# Patient Record
Sex: MALE | Race: WHITE
[De-identification: names, ages, dates, MRNs, and addresses within clinical notes are randomized per-mention and may not be internally consistent; named-entity substitution may affect disease eponyms.]

---

## 2006-01-30 VITALS — SYSTOLIC BLOOD PRESSURE: 5 MMHG

## 2017-04-27 ENCOUNTER — HOSPITAL ENCOUNTER (OUTPATIENT)
Dept: HOSPITAL 19 - COL.RAD | Age: 82
End: 2017-04-27
Payer: MEDICARE

## 2017-04-27 DIAGNOSIS — M54.16: ICD-10-CM

## 2017-04-27 DIAGNOSIS — M46.1: Primary | ICD-10-CM

## 2017-05-25 ENCOUNTER — HOSPITAL ENCOUNTER (OUTPATIENT)
Dept: HOSPITAL 6 - AMSURD | Age: 82
End: 2017-05-25
Attending: INTERNAL MEDICINE
Payer: MEDICARE

## 2017-05-25 ENCOUNTER — HOSPITAL ENCOUNTER (OUTPATIENT)
Dept: HOSPITAL 19 - ZLAB.WCH | Age: 82
End: 2017-05-25

## 2017-05-25 VITALS
SYSTOLIC BLOOD PRESSURE: 128 MMHG | SYSTOLIC BLOOD PRESSURE: 128 MMHG | SYSTOLIC BLOOD PRESSURE: 128 MMHG | DIASTOLIC BLOOD PRESSURE: 68 MMHG | SYSTOLIC BLOOD PRESSURE: 128 MMHG | SYSTOLIC BLOOD PRESSURE: 128 MMHG | DIASTOLIC BLOOD PRESSURE: 68 MMHG | DIASTOLIC BLOOD PRESSURE: 68 MMHG | SYSTOLIC BLOOD PRESSURE: 128 MMHG | SYSTOLIC BLOOD PRESSURE: 128 MMHG | DIASTOLIC BLOOD PRESSURE: 68 MMHG | DIASTOLIC BLOOD PRESSURE: 68 MMHG | SYSTOLIC BLOOD PRESSURE: 128 MMHG | DIASTOLIC BLOOD PRESSURE: 68 MMHG | DIASTOLIC BLOOD PRESSURE: 68 MMHG | SYSTOLIC BLOOD PRESSURE: 128 MMHG | DIASTOLIC BLOOD PRESSURE: 68 MMHG | DIASTOLIC BLOOD PRESSURE: 68 MMHG | DIASTOLIC BLOOD PRESSURE: 68 MMHG | SYSTOLIC BLOOD PRESSURE: 128 MMHG

## 2017-05-25 VITALS — WEIGHT: 197.31 LBS | HEIGHT: 69.02 IN | BODY MASS INDEX: 29.22 KG/M2

## 2017-05-25 DIAGNOSIS — E11.65: Primary | ICD-10-CM

## 2017-05-25 DIAGNOSIS — Z01.89: Primary | ICD-10-CM

## 2017-05-25 DIAGNOSIS — E53.8: ICD-10-CM

## 2017-05-25 DIAGNOSIS — N52.9: ICD-10-CM

## 2017-05-25 DIAGNOSIS — Z01.812: ICD-10-CM

## 2017-05-25 DIAGNOSIS — I10: ICD-10-CM

## 2017-05-25 DIAGNOSIS — E78.5: ICD-10-CM

## 2017-05-26 ENCOUNTER — HOSPITAL ENCOUNTER (OUTPATIENT)
Dept: HOSPITAL 6 - CARDREHAB | Age: 82
End: 2017-05-26
Attending: INTERNAL MEDICINE
Payer: MEDICARE

## 2017-05-26 DIAGNOSIS — R06.02: Primary | ICD-10-CM

## 2017-05-26 DIAGNOSIS — I10: ICD-10-CM

## 2017-05-26 PROCEDURE — A9500 TC99M SESTAMIBI: HCPCS

## 2017-05-30 ENCOUNTER — HOSPITAL ENCOUNTER (OUTPATIENT)
Dept: HOSPITAL 6 - RAD | Age: 82
End: 2017-05-30
Attending: INTERNAL MEDICINE
Payer: MEDICARE

## 2017-05-30 DIAGNOSIS — E78.5: ICD-10-CM

## 2017-05-30 DIAGNOSIS — R06.02: ICD-10-CM

## 2017-05-30 DIAGNOSIS — I10: ICD-10-CM

## 2017-05-30 DIAGNOSIS — G45.9: Primary | ICD-10-CM

## 2017-05-30 DIAGNOSIS — E78.00: ICD-10-CM

## 2017-05-30 DIAGNOSIS — E11.9: ICD-10-CM

## 2017-06-02 ENCOUNTER — HOSPITAL ENCOUNTER (OUTPATIENT)
Dept: HOSPITAL 6 - CARDREHAB | Age: 82
End: 2017-06-02
Attending: INTERNAL MEDICINE
Payer: MEDICARE

## 2017-06-02 DIAGNOSIS — E11.9: ICD-10-CM

## 2017-06-02 DIAGNOSIS — I10: ICD-10-CM

## 2017-06-02 DIAGNOSIS — E78.00: ICD-10-CM

## 2017-06-02 DIAGNOSIS — E78.5: ICD-10-CM

## 2017-06-02 DIAGNOSIS — R06.02: Primary | ICD-10-CM

## 2017-06-02 PROCEDURE — A9500 TC99M SESTAMIBI: HCPCS

## 2017-06-19 ENCOUNTER — HOSPITAL ENCOUNTER (OUTPATIENT)
Dept: HOSPITAL 19 - COL.CAR | Age: 82
Discharge: HOME | End: 2017-06-19
Attending: INTERNAL MEDICINE
Payer: MEDICARE

## 2017-06-19 VITALS — HEART RATE: 65 BPM | SYSTOLIC BLOOD PRESSURE: 122 MMHG | DIASTOLIC BLOOD PRESSURE: 65 MMHG

## 2017-06-19 VITALS — BODY MASS INDEX: 29.83 KG/M2 | HEIGHT: 70 IN | WEIGHT: 208.34 LBS

## 2017-06-19 VITALS — SYSTOLIC BLOOD PRESSURE: 11 MMHG | HEART RATE: 74 BPM | DIASTOLIC BLOOD PRESSURE: 67 MMHG

## 2017-06-19 VITALS — SYSTOLIC BLOOD PRESSURE: 122 MMHG | DIASTOLIC BLOOD PRESSURE: 67 MMHG | HEART RATE: 75 BPM

## 2017-06-19 VITALS — HEART RATE: 77 BPM | SYSTOLIC BLOOD PRESSURE: 109 MMHG | DIASTOLIC BLOOD PRESSURE: 62 MMHG

## 2017-06-19 VITALS — DIASTOLIC BLOOD PRESSURE: 60 MMHG | HEART RATE: 76 BPM | TEMPERATURE: 97.6 F | SYSTOLIC BLOOD PRESSURE: 127 MMHG

## 2017-06-19 VITALS — DIASTOLIC BLOOD PRESSURE: 67 MMHG | HEART RATE: 75 BPM | SYSTOLIC BLOOD PRESSURE: 124 MMHG

## 2017-06-19 VITALS — SYSTOLIC BLOOD PRESSURE: 135 MMHG | DIASTOLIC BLOOD PRESSURE: 78 MMHG | HEART RATE: 76 BPM

## 2017-06-19 VITALS — HEART RATE: 76 BPM | DIASTOLIC BLOOD PRESSURE: 68 MMHG | SYSTOLIC BLOOD PRESSURE: 122 MMHG

## 2017-06-19 VITALS — DIASTOLIC BLOOD PRESSURE: 67 MMHG | SYSTOLIC BLOOD PRESSURE: 128 MMHG | HEART RATE: 73 BPM

## 2017-06-19 DIAGNOSIS — E53.8: ICD-10-CM

## 2017-06-19 DIAGNOSIS — Z85.46: ICD-10-CM

## 2017-06-19 DIAGNOSIS — G25.81: ICD-10-CM

## 2017-06-19 DIAGNOSIS — M19.90: ICD-10-CM

## 2017-06-19 DIAGNOSIS — Z80.9: ICD-10-CM

## 2017-06-19 DIAGNOSIS — I07.1: ICD-10-CM

## 2017-06-19 DIAGNOSIS — Z96.653: ICD-10-CM

## 2017-06-19 DIAGNOSIS — I35.0: ICD-10-CM

## 2017-06-19 DIAGNOSIS — I25.10: Primary | ICD-10-CM

## 2017-06-19 DIAGNOSIS — I10: ICD-10-CM

## 2017-06-19 DIAGNOSIS — G51.0: ICD-10-CM

## 2017-06-19 DIAGNOSIS — F32.9: ICD-10-CM

## 2017-06-19 DIAGNOSIS — E11.42: ICD-10-CM

## 2017-06-19 DIAGNOSIS — E78.2: ICD-10-CM

## 2017-06-19 LAB
ANION GAP SERPL CALC-SCNC: 14 MMOL/L (ref 7–16)
BUN SERPL-MCNC: 18 MG/DL (ref 9–20)
CALCIUM SERPL-MCNC: 9.4 MG/DL (ref 8.4–10.2)
CHLORIDE SERPL-SCNC: 103 MMOL/L (ref 98–107)
CO2 SERPL-SCNC: 22 MMOL/L (ref 22–30)
CREAT SERPL-SCNC: 0.79 MG/DL (ref 0.66–1.25)
ERYTHROCYTE [DISTWIDTH] IN BLOOD BY AUTOMATED COUNT: 12.7 % (ref 11.5–14.5)
GLUCOSE SERPL-MCNC: 168 MG/DL (ref 74–106)
HCT VFR BLD AUTO: 40 % (ref 42–52)
HGB BLD-MCNC: 13.4 G/DL (ref 13.5–18)
INR BLD: 1 (ref 0.8–3)
MCH RBC QN AUTO: 32 PG (ref 27–31)
MCHC RBC AUTO-ENTMCNC: 34 G/DL (ref 33–37)
MCV RBC AUTO: 97 FL (ref 80–100)
PLATELET # BLD AUTO: 166 K/MM3 (ref 130–400)
PMV BLD AUTO: 10 FL (ref 7.4–10.4)
POTASSIUM SERPL-SCNC: 4.6 MMOL/L (ref 3.4–5)
PROTHROMBIN TIME: 11 SECONDS (ref 9.7–12.8)
RBC # BLD AUTO: 4.13 M/MM3 (ref 4.2–5.6)
SODIUM SERPL-SCNC: 139 MMOL/L (ref 137–145)
WBC # BLD AUTO: 6.2 K/MM3 (ref 4.8–10.8)

## 2017-06-19 PROCEDURE — C1769 GUIDE WIRE: HCPCS

## 2017-07-24 ENCOUNTER — HOSPITAL ENCOUNTER (OUTPATIENT)
Dept: HOSPITAL 6 - LAB | Age: 82
End: 2017-07-24
Attending: INTERNAL MEDICINE
Payer: MEDICARE

## 2017-07-24 DIAGNOSIS — M54.16: ICD-10-CM

## 2017-07-24 DIAGNOSIS — Z01.818: Primary | ICD-10-CM

## 2017-07-24 DIAGNOSIS — N30.00: ICD-10-CM

## 2017-08-12 ENCOUNTER — HOSPITAL ENCOUNTER (EMERGENCY)
Dept: HOSPITAL 19 - COL.ER | Age: 82
Discharge: HOME | End: 2017-08-12
Payer: MEDICARE

## 2017-08-12 VITALS — DIASTOLIC BLOOD PRESSURE: 74 MMHG | HEART RATE: 92 BPM | SYSTOLIC BLOOD PRESSURE: 140 MMHG

## 2017-08-12 VITALS — WEIGHT: 200.4 LBS | HEIGHT: 70 IN | BODY MASS INDEX: 28.69 KG/M2

## 2017-08-12 VITALS — TEMPERATURE: 97.7 F

## 2017-08-12 DIAGNOSIS — M54.5: ICD-10-CM

## 2017-08-12 DIAGNOSIS — Z79.84: ICD-10-CM

## 2017-08-12 DIAGNOSIS — Z95.5: ICD-10-CM

## 2017-08-12 DIAGNOSIS — I25.10: ICD-10-CM

## 2017-08-12 DIAGNOSIS — Z98.1: ICD-10-CM

## 2017-08-12 DIAGNOSIS — E11.9: ICD-10-CM

## 2017-08-12 DIAGNOSIS — I10: ICD-10-CM

## 2017-08-12 DIAGNOSIS — G89.18: Primary | ICD-10-CM

## 2017-08-12 LAB
ADD PATHOLOGY DIFF REVIEW: NO
ADJUSTED CALCIUM: 9.7 MG/DL (ref 8.4–10.2)
ALBUMIN SERPL-MCNC: 3.6 GM/DL (ref 3.5–5)
ALP SERPL-CCNC: 118 U/L (ref 50–136)
ALT SERPL-CCNC: 28 U/L (ref 21–72)
ANION GAP SERPL CALC-SCNC: 11 MMOL/L (ref 7–16)
ANISOCYTOSIS BLD QL: (no result)
BILIRUB SERPL-MCNC: 0.7 MG/DL (ref 0–1)
BUN SERPL-MCNC: 11 MG/DL (ref 9–20)
CALCIUM SERPL-MCNC: 9.4 MG/DL (ref 8.4–10.2)
CHLORIDE SERPL-SCNC: 98 MMOL/L (ref 98–107)
CO2 SERPL-SCNC: 26 MMOL/L (ref 22–30)
CREAT SERPL-SCNC: 0.8 MG/DL (ref 0.66–1.25)
ERYTHROCYTE [DISTWIDTH] IN BLOOD BY AUTOMATED COUNT: 13.5 % (ref 11.5–14.5)
GLUCOSE SERPL-MCNC: 164 MG/DL (ref 74–106)
HCT VFR BLD AUTO: 30.2 % (ref 42–52)
HGB BLD-MCNC: 9.8 G/DL (ref 13.5–18)
HYPOCHROMIA BLD QL SMEAR: (no result)
INR BLD: 1.1 (ref 0.8–3)
MCH RBC QN AUTO: 32 PG (ref 27–31)
MCHC RBC AUTO-ENTMCNC: 33 G/DL (ref 33–37)
MCV RBC AUTO: 99 FL (ref 80–100)
MONOCYTES NFR BLD: 5 % (ref 1.7–9.3)
NEUTS BAND NFR BLD: 6 % (ref 0–10)
NEUTS SEG NFR BLD MANUAL: 74 % (ref 42–75.2)
PLATELET # BLD AUTO: 304 K/MM3 (ref 130–400)
PMV BLD AUTO: 8.8 FL (ref 7.4–10.4)
POIKILOCYTOSIS BLD QL SMEAR: (no result)
POLYCHROMASIA BLD QL SMEAR: (no result)
POTASSIUM SERPL-SCNC: 3.7 MMOL/L (ref 3.4–5)
PROT SERPL-MCNC: 6.8 GM/DL (ref 6.4–8.2)
PROTHROMBIN TIME: 12.2 SECONDS (ref 9.7–12.8)
RBC # BLD AUTO: 3.05 M/MM3 (ref 4.2–5.6)
SODIUM SERPL-SCNC: 135 MMOL/L (ref 137–145)
TOTAL CELLS COUNTED BLD: 100
WBC # BLD AUTO: 8.7 K/MM3 (ref 4.8–10.8)

## 2017-11-02 ENCOUNTER — HOSPITAL ENCOUNTER (OUTPATIENT)
Dept: HOSPITAL 6 - LAB | Age: 82
End: 2017-11-02
Attending: INTERNAL MEDICINE
Payer: MEDICARE

## 2017-11-02 DIAGNOSIS — I10: ICD-10-CM

## 2017-11-02 DIAGNOSIS — I25.10: ICD-10-CM

## 2017-11-02 DIAGNOSIS — E11.65: Primary | ICD-10-CM

## 2017-11-02 LAB
ALBUMIN SERPL-MCNC: 3.6 G/DL (ref 3.5–5)
ALT SERPL-CCNC: 29 U/L (ref 21–72)
AST SERPL-CCNC: 30 U/L (ref 17–59)
BILIRUB SERPL-MCNC: 0.6 MG/DL (ref 0.2–1.3)
BUN/CREAT SERPL: 22.9 (ref 6–26)
CALCIUM SERPL-MCNC: 9.7 MG/DL (ref 8.4–10.2)
CO2 SERPL-SCNC: 27 MMOL/L (ref 22–30)
ERYTHROCYTE [DISTWIDTH] IN BLOOD BY AUTOMATED COUNT: 14.4 % (ref 11.5–14.5)
GLUCOSE SERPL-MCNC: 139 MG/DL (ref 75–110)
HCT VFR BLD AUTO: 36.5 % (ref 42–52)
HGB BLD-MCNC: 11.3 G/DL (ref 13.5–18)
LYMPHOCYTES NFR BLD MANUAL: 14 % (ref 20–51)
MCH RBC QN AUTO: 31 PG (ref 27–31)
MCHC RBC AUTO-ENTMCNC: 31 G/DL (ref 33–37)
MCV RBC AUTO: 99 FL (ref 78–100)
MONOCYTES NFR BLD: 5 % (ref 3–10)
NEUTS SEG NFR BLD MANUAL: 78 % (ref 42–75)
PLATELET # BLD AUTO: 280 K/MM3 (ref 130–400)
PMV BLD AUTO: 8.9 FL (ref 7.4–10.4)
POTASSIUM SERPL-SCNC: 4.7 MMOL/L (ref 3.6–5)
PROT SERPL-MCNC: 6.9 G/DL (ref 6.3–8.2)
RBC # BLD AUTO: 3.68 M/MM3 (ref 4.2–5.6)
SODIUM SERPL-SCNC: 138 MMOL/L (ref 137–145)
WBC # BLD AUTO: 5.5 K/MM3 (ref 4.8–10.8)

## 2018-02-09 ENCOUNTER — HOSPITAL ENCOUNTER (OUTPATIENT)
Dept: HOSPITAL 19 - ZLAB.WCH | Age: 83
End: 2018-02-09

## 2018-02-09 ENCOUNTER — HOSPITAL ENCOUNTER (OUTPATIENT)
Dept: HOSPITAL 6 - LAB | Age: 83
End: 2018-02-09
Attending: INTERNAL MEDICINE
Payer: MEDICARE

## 2018-02-09 DIAGNOSIS — M54.12: ICD-10-CM

## 2018-02-09 DIAGNOSIS — Z88.5: ICD-10-CM

## 2018-02-09 DIAGNOSIS — I25.10: Primary | ICD-10-CM

## 2018-02-09 DIAGNOSIS — E11.65: ICD-10-CM

## 2018-02-09 DIAGNOSIS — Z01.89: Primary | ICD-10-CM

## 2018-02-09 LAB
ALBUMIN SERPL-MCNC: 3.5 G/DL (ref 3.5–5)
ALT SERPL-CCNC: 29 U/L (ref 21–72)
AST SERPL-CCNC: 22 U/L (ref 17–59)
BILIRUB SERPL-MCNC: 0.5 MG/DL (ref 0.2–1.3)
BUN/CREAT SERPL: 20.3 (ref 6–26)
CALCIUM SERPL-MCNC: 9.5 MG/DL (ref 8.4–10.2)
CO2 SERPL-SCNC: 29 MMOL/L (ref 22–30)
ERYTHROCYTE [DISTWIDTH] IN BLOOD BY AUTOMATED COUNT: 13.9 % (ref 11.5–14.5)
ERYTHROCYTE [SEDIMENTATION RATE] IN BLOOD: 15 MM/HR (ref 0–20)
GLUCOSE SERPL-MCNC: 214 MG/DL (ref 75–110)
HCT VFR BLD AUTO: 41.6 % (ref 42–52)
HGB BLD-MCNC: 13.1 G/DL (ref 13.5–18)
LYMPHOCYTES NFR BLD MANUAL: 10 % (ref 20–51)
MCH RBC QN AUTO: 30 PG (ref 27–31)
MCHC RBC AUTO-ENTMCNC: 32 G/DL (ref 33–37)
MCV RBC AUTO: 95 FL (ref 78–100)
MONOCYTES NFR BLD: 8 % (ref 3–10)
NEUTS SEG NFR BLD MANUAL: 80 % (ref 42–75)
PLATELET # BLD AUTO: 240 K/MM3 (ref 130–400)
PMV BLD AUTO: 9.2 FL (ref 7.4–10.4)
POTASSIUM SERPL-SCNC: 4.6 MMOL/L (ref 3.6–5)
PROT SERPL-MCNC: 6.7 G/DL (ref 6.3–8.2)
RBC # BLD AUTO: 4.36 M/MM3 (ref 4.2–5.6)
SODIUM SERPL-SCNC: 137 MMOL/L (ref 137–145)
WBC # BLD AUTO: 8.2 K/MM3 (ref 4.8–10.8)

## 2018-02-16 ENCOUNTER — HOSPITAL ENCOUNTER (OUTPATIENT)
Dept: HOSPITAL 19 - MHCPAIN | Age: 83
End: 2018-02-16
Attending: ANESTHESIOLOGY
Payer: MEDICARE

## 2018-02-16 DIAGNOSIS — M47.812: ICD-10-CM

## 2018-02-16 DIAGNOSIS — G89.29: Primary | ICD-10-CM

## 2018-02-16 DIAGNOSIS — M54.12: ICD-10-CM

## 2018-02-16 PROCEDURE — G0463 HOSPITAL OUTPT CLINIC VISIT: HCPCS

## 2018-03-19 ENCOUNTER — HOSPITAL ENCOUNTER (OUTPATIENT)
Dept: HOSPITAL 19 - MHCPAIN | Age: 83
End: 2018-03-19
Attending: ANESTHESIOLOGY
Payer: MEDICARE

## 2018-03-19 DIAGNOSIS — M50.90: ICD-10-CM

## 2018-03-19 DIAGNOSIS — G89.29: Primary | ICD-10-CM

## 2018-03-19 DIAGNOSIS — M54.12: ICD-10-CM

## 2018-03-19 PROCEDURE — G0463 HOSPITAL OUTPT CLINIC VISIT: HCPCS

## 2018-04-05 ENCOUNTER — HOSPITAL ENCOUNTER (OUTPATIENT)
Dept: HOSPITAL 19 - COL.LAB | Age: 83
End: 2018-04-05
Attending: ANESTHESIOLOGY
Payer: MEDICARE

## 2018-04-05 ENCOUNTER — HOSPITAL ENCOUNTER (OUTPATIENT)
Dept: HOSPITAL 19 - MHCPAIN | Age: 83
End: 2018-04-05
Attending: ANESTHESIOLOGY
Payer: MEDICARE

## 2018-04-05 DIAGNOSIS — Z88.5: ICD-10-CM

## 2018-04-05 DIAGNOSIS — G89.29: Primary | ICD-10-CM

## 2018-04-05 DIAGNOSIS — E11.40: Primary | ICD-10-CM

## 2018-04-05 DIAGNOSIS — M50.90: ICD-10-CM

## 2018-04-05 DIAGNOSIS — M54.12: ICD-10-CM

## 2018-04-05 PROCEDURE — G0463 HOSPITAL OUTPT CLINIC VISIT: HCPCS

## 2018-04-20 ENCOUNTER — HOSPITAL ENCOUNTER (OUTPATIENT)
Dept: HOSPITAL 19 - MHCPAIN | Age: 83
End: 2018-04-20
Attending: ANESTHESIOLOGY
Payer: MEDICARE

## 2018-04-20 DIAGNOSIS — M50.90: ICD-10-CM

## 2018-04-20 DIAGNOSIS — G89.29: Primary | ICD-10-CM

## 2018-04-20 DIAGNOSIS — M54.12: ICD-10-CM

## 2018-04-20 PROCEDURE — G0463 HOSPITAL OUTPT CLINIC VISIT: HCPCS

## 2018-05-15 ENCOUNTER — HOSPITAL ENCOUNTER (OUTPATIENT)
Dept: HOSPITAL 19 - COL.ER | Age: 83
Setting detail: OBSERVATION
LOS: 1 days | Discharge: HOME | End: 2018-05-16
Attending: SURGERY | Admitting: SURGERY
Payer: MEDICARE

## 2018-05-15 VITALS — OXYGEN SATURATION: 96 %

## 2018-05-15 VITALS — HEART RATE: 101 BPM | TEMPERATURE: 97.3 F | DIASTOLIC BLOOD PRESSURE: 73 MMHG | SYSTOLIC BLOOD PRESSURE: 139 MMHG

## 2018-05-15 VITALS — OXYGEN SATURATION: 97 %

## 2018-05-15 VITALS — OXYGEN SATURATION: 99 %

## 2018-05-15 VITALS — OXYGEN SATURATION: 95 %

## 2018-05-15 VITALS — WEIGHT: 202.16 LBS | BODY MASS INDEX: 28.94 KG/M2 | HEIGHT: 70 IN

## 2018-05-15 VITALS — OXYGEN SATURATION: 100 %

## 2018-05-15 DIAGNOSIS — E11.9: ICD-10-CM

## 2018-05-15 DIAGNOSIS — Z90.49: ICD-10-CM

## 2018-05-15 DIAGNOSIS — K56.600: Primary | ICD-10-CM

## 2018-05-15 DIAGNOSIS — Z88.6: ICD-10-CM

## 2018-05-15 DIAGNOSIS — E78.5: ICD-10-CM

## 2018-05-15 DIAGNOSIS — Z79.82: ICD-10-CM

## 2018-05-15 DIAGNOSIS — Z88.5: ICD-10-CM

## 2018-05-15 DIAGNOSIS — Z79.4: ICD-10-CM

## 2018-05-15 DIAGNOSIS — Z96.653: ICD-10-CM

## 2018-05-15 LAB
ALBUMIN SERPL-MCNC: 4.1 GM/DL (ref 3.5–5)
ALP SERPL-CCNC: 166 U/L (ref 50–136)
ALT SERPL-CCNC: 26 U/L (ref 21–72)
ANION GAP SERPL CALC-SCNC: 15 MMOL/L (ref 7–16)
AST SERPL-CCNC: 20 U/L (ref 15–37)
BASOPHILS # BLD: 0 10*3/UL (ref 0–0.2)
BASOPHILS NFR BLD AUTO: 0.2 % (ref 0–2)
BILIRUB SERPL-MCNC: 0.8 MG/DL (ref 0–1)
BUN SERPL-MCNC: 18 MG/DL (ref 9–20)
CALCIUM SERPL-MCNC: 9.8 MG/DL (ref 8.4–10.2)
CHLORIDE SERPL-SCNC: 98 MMOL/L (ref 98–107)
CO2 SERPL-SCNC: 22 MMOL/L (ref 22–30)
CREAT SERPL-SCNC: 0.81 MG/DL (ref 0.66–1.25)
EOSINOPHIL # BLD: 0 10*3/UL (ref 0–0.7)
EOSINOPHIL NFR BLD: 0.2 % (ref 0–4)
ERYTHROCYTE [DISTWIDTH] IN BLOOD BY AUTOMATED COUNT: 13.7 % (ref 11.5–14.5)
GLUCOSE SERPL-MCNC: 304 MG/DL (ref 74–106)
GLUCOSE UR QL STRIP.AUTO: (no result)
GRANULOCYTES # BLD AUTO: 90.3 % (ref 42.2–75.2)
HCT VFR BLD AUTO: 40.8 % (ref 42–52)
HGB BLD-MCNC: 13.5 G/DL (ref 13.5–18)
INR BLD: 1 (ref 0.8–3)
KETONES UR STRIP.AUTO-MCNC: (no result) MG/DL
LIPASE SERPL-CCNC: 25 U/L (ref 23–300)
LYMPHOCYTES # BLD: 0.5 10*3/UL (ref 1.2–3.4)
LYMPHOCYTES NFR BLD: 4.9 % (ref 20–51)
MCH RBC QN AUTO: 32 PG (ref 27–31)
MCHC RBC AUTO-ENTMCNC: 33 G/DL (ref 33–37)
MCV RBC AUTO: 96 FL (ref 80–100)
MONOCYTES # BLD: 0.4 10*3/UL (ref 0.1–0.6)
MONOCYTES NFR BLD AUTO: 4.1 % (ref 1.7–9.3)
NEUTROPHILS # BLD: 9.5 10*3/UL (ref 1.4–6.5)
PH UR STRIP.AUTO: 6 [PH] (ref 5–8)
PLATELET # BLD AUTO: 238 K/MM3 (ref 130–400)
PMV BLD AUTO: 8.8 FL (ref 7.4–10.4)
POTASSIUM SERPL-SCNC: 4.6 MMOL/L (ref 3.4–5)
PROT SERPL-MCNC: 8.3 GM/DL (ref 6.4–8.2)
PROTHROMBIN TIME: 11.3 SECONDS (ref 9.7–12.8)
RBC # BLD AUTO: 4.27 M/MM3 (ref 4.2–5.6)
RBC # UR STRIP.AUTO: (no result) /UL
RBC # UR: (no result) /HPF
SODIUM SERPL-SCNC: 135 MMOL/L (ref 137–145)
SP GR UR STRIP.AUTO: 1.03 (ref 1–1.03)
SQUAMOUS # URNS: (no result) /HPF
TROPONIN I SERPL-MCNC: < 0.012 NG/ML (ref 0–0.03)
UA DIPSTICK PNL UR STRIP.AUTO: (no result)
URN COLLECT METHOD CLASS: (no result)

## 2018-05-16 VITALS — OXYGEN SATURATION: 98 %

## 2018-05-16 VITALS — OXYGEN SATURATION: 97 %

## 2018-05-16 VITALS — HEART RATE: 87 BPM | DIASTOLIC BLOOD PRESSURE: 66 MMHG | SYSTOLIC BLOOD PRESSURE: 142 MMHG | TEMPERATURE: 97 F

## 2018-05-16 VITALS — HEART RATE: 94 BPM | TEMPERATURE: 97.6 F | SYSTOLIC BLOOD PRESSURE: 135 MMHG | DIASTOLIC BLOOD PRESSURE: 73 MMHG

## 2018-05-16 VITALS — DIASTOLIC BLOOD PRESSURE: 86 MMHG | HEART RATE: 82 BPM | SYSTOLIC BLOOD PRESSURE: 1139 MMHG | TEMPERATURE: 98.1 F

## 2018-05-16 VITALS — HEART RATE: 80 BPM | TEMPERATURE: 97.7 F | SYSTOLIC BLOOD PRESSURE: 119 MMHG | DIASTOLIC BLOOD PRESSURE: 60 MMHG

## 2018-05-16 VITALS — OXYGEN SATURATION: 99 %

## 2018-05-16 LAB
ANION GAP SERPL CALC-SCNC: 10 MMOL/L (ref 7–16)
BASOPHILS # BLD: 0 10*3/UL (ref 0–0.2)
BASOPHILS NFR BLD AUTO: 0.5 % (ref 0–2)
BUN SERPL-MCNC: 14 MG/DL (ref 9–20)
CALCIUM SERPL-MCNC: 9.2 MG/DL (ref 8.4–10.2)
CHLORIDE SERPL-SCNC: 104 MMOL/L (ref 98–107)
CO2 SERPL-SCNC: 26 MMOL/L (ref 22–30)
CREAT SERPL-SCNC: 0.82 MG/DL (ref 0.66–1.25)
EOSINOPHIL # BLD: 0.2 10*3/UL (ref 0–0.7)
EOSINOPHIL NFR BLD: 2 % (ref 0–4)
ERYTHROCYTE [DISTWIDTH] IN BLOOD BY AUTOMATED COUNT: 13.7 % (ref 11.5–14.5)
GLUCOSE SERPL-MCNC: 141 MG/DL (ref 74–106)
GRANULOCYTES # BLD AUTO: 74.2 % (ref 42.2–75.2)
HCT VFR BLD AUTO: 37.5 % (ref 42–52)
HGB BLD-MCNC: 12.2 G/DL (ref 13.5–18)
LYMPHOCYTES # BLD: 1 10*3/UL (ref 1.2–3.4)
LYMPHOCYTES NFR BLD: 13.6 % (ref 20–51)
MCH RBC QN AUTO: 31 PG (ref 27–31)
MCHC RBC AUTO-ENTMCNC: 33 G/DL (ref 33–37)
MCV RBC AUTO: 96 FL (ref 80–100)
MONOCYTES # BLD: 0.7 10*3/UL (ref 0.1–0.6)
MONOCYTES NFR BLD AUTO: 9.3 % (ref 1.7–9.3)
NEUTROPHILS # BLD: 5.6 10*3/UL (ref 1.4–6.5)
PLATELET # BLD AUTO: 234 K/MM3 (ref 130–400)
PMV BLD AUTO: 9.2 FL (ref 7.4–10.4)
POTASSIUM SERPL-SCNC: 3.4 MMOL/L (ref 3.4–5)
RBC # BLD AUTO: 3.89 M/MM3 (ref 4.2–5.6)
SODIUM SERPL-SCNC: 140 MMOL/L (ref 137–145)

## 2018-06-07 ENCOUNTER — HOSPITAL ENCOUNTER (EMERGENCY)
Dept: HOSPITAL 19 - COL.ER | Age: 83
Discharge: HOME | End: 2018-06-07
Payer: MEDICARE

## 2018-06-07 VITALS — WEIGHT: 205.47 LBS | HEIGHT: 70 IN | BODY MASS INDEX: 29.42 KG/M2

## 2018-06-07 VITALS — HEART RATE: 91 BPM

## 2018-06-07 VITALS — TEMPERATURE: 97.4 F | SYSTOLIC BLOOD PRESSURE: 145 MMHG | DIASTOLIC BLOOD PRESSURE: 82 MMHG

## 2018-06-07 DIAGNOSIS — E11.9: ICD-10-CM
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
## 2018-06-07 DIAGNOSIS — Z95.5: ICD-10-CM

## 2018-06-07 DIAGNOSIS — Z79.4: ICD-10-CM

## 2018-06-07 DIAGNOSIS — I25.10: ICD-10-CM

## 2018-06-07 DIAGNOSIS — Z79.82: ICD-10-CM

## 2018-06-07 DIAGNOSIS — T25.031A: Primary | ICD-10-CM
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
## 2018-06-18 ENCOUNTER — HOSPITAL ENCOUNTER (OUTPATIENT)
Dept: HOSPITAL 19 - MHCPAIN | Age: 83
End: 2018-06-18
Attending: ANESTHESIOLOGY
Payer: MEDICARE

## 2018-06-18 DIAGNOSIS — M54.12: ICD-10-CM

## 2018-06-18 DIAGNOSIS — M50.322: ICD-10-CM

## 2018-06-18 DIAGNOSIS — G89.29: Primary | ICD-10-CM

## 2018-06-18 PROCEDURE — G0463 HOSPITAL OUTPT CLINIC VISIT: HCPCS

## 2018-08-07 ENCOUNTER — HOSPITAL ENCOUNTER (OUTPATIENT)
Dept: HOSPITAL 6 - LAB | Age: 83
End: 2018-08-07
Attending: INTERNAL MEDICINE
Payer: MEDICARE

## 2018-08-07 ENCOUNTER — HOSPITAL ENCOUNTER (OUTPATIENT)
Dept: HOSPITAL 19 - ZLAB.WCH | Age: 83
End: 2018-08-07

## 2018-08-07 DIAGNOSIS — Z01.89: Primary | ICD-10-CM

## 2018-08-07 DIAGNOSIS — I10: ICD-10-CM

## 2018-08-07 DIAGNOSIS — E11.65: Primary | ICD-10-CM

## 2018-08-07 DIAGNOSIS — E78.5: ICD-10-CM

## 2018-08-07 DIAGNOSIS — R20.2: ICD-10-CM

## 2018-08-07 LAB
ALBUMIN SERPL-MCNC: 4 G/DL (ref 3.5–5)
ALT SERPL-CCNC: 21 U/L (ref 21–72)
AST SERPL-CCNC: 22 U/L (ref 17–59)
BASOPHILS # BLD: 0 10*3/UL (ref 0.02–0.1)
BILIRUB SERPL-MCNC: 0.6 MG/DL (ref 0.2–1.3)
BUN/CREAT SERPL: 23.8 (ref 6–26)
CALCIUM SERPL-MCNC: 9.6 MG/DL (ref 8.4–10.2)
CO2 SERPL-SCNC: 26 MMOL/L (ref 22–30)
EOSINOPHIL # BLD: 0.2 10*3/UL (ref 0.04–0.4)
EOSINOPHIL NFR BLD: 1.9 % (ref 0–4)
ERYTHROCYTE [DISTWIDTH] IN BLOOD BY AUTOMATED COUNT: 13.9 % (ref 11.5–14.5)
ERYTHROCYTE [SEDIMENTATION RATE] IN BLOOD: 24 MM/HR (ref 0–20)
GLUCOSE SERPL-MCNC: 123 MG/DL (ref 75–110)
HCT VFR BLD AUTO: 39.8 % (ref 42–52)
HGB BLD-MCNC: 12.8 G/DL (ref 13.5–18)
LYMPHOCYTES # BLD: 1.3 10*3/UL (ref 1.5–4)
MCH RBC QN AUTO: 31 PG (ref 27–31)
MCHC RBC AUTO-ENTMCNC: 32 G/DL (ref 33–37)
MCV RBC AUTO: 97 FL (ref 78–100)
MONOCYTES # BLD: 0.6 10*3/UL (ref 0.2–0.8)
NEUTROPHILS # BLD: 5.7 10*3/UL (ref 1.4–6.5)
PLATELET # BLD AUTO: 212 K/MM3 (ref 130–400)
PMV BLD AUTO: 8.9 FL (ref 7.4–10.4)
POTASSIUM SERPL-SCNC: 4.5 MMOL/L (ref 3.6–5)
PROT SERPL-MCNC: 7.3 G/DL (ref 6.3–8.2)
RBC # BLD AUTO: 4.09 M/MM3 (ref 4.2–5.6)
SODIUM SERPL-SCNC: 135 MMOL/L (ref 137–145)
WBC # BLD AUTO: 7.8 K/MM3 (ref 4.8–10.8)

## 2018-08-08 LAB — FOLATE SERPL-MCNC: 10.9 NG/ML (ref 7–31.4)

## 2018-08-15 ENCOUNTER — HOSPITAL ENCOUNTER (OUTPATIENT)
Dept: HOSPITAL 19 - MHCPAIN | Age: 83
End: 2018-08-15
Attending: ANESTHESIOLOGY
Payer: MEDICARE

## 2018-08-15 DIAGNOSIS — M50.90: Primary | ICD-10-CM

## 2018-08-15 DIAGNOSIS — G89.29: ICD-10-CM

## 2018-08-15 PROCEDURE — G0463 HOSPITAL OUTPT CLINIC VISIT: HCPCS

## 2019-03-15 ENCOUNTER — HOSPITAL ENCOUNTER (OUTPATIENT)
Dept: HOSPITAL 19 - ZLAB.WCH | Age: 84
End: 2019-03-15

## 2019-03-15 ENCOUNTER — HOSPITAL ENCOUNTER (OUTPATIENT)
Dept: HOSPITAL 6 - LAB | Age: 84
End: 2019-03-15
Attending: INTERNAL MEDICINE
Payer: MEDICARE

## 2019-03-15 DIAGNOSIS — I25.10: ICD-10-CM

## 2019-03-15 DIAGNOSIS — Z01.89: Primary | ICD-10-CM

## 2019-03-15 DIAGNOSIS — E11.42: Primary | ICD-10-CM

## 2019-03-15 LAB
ALBUMIN SERPL-MCNC: 4 G/DL (ref 3.5–5)
ALT SERPL-CCNC: 19 U/L (ref 21–72)
AST SERPL-CCNC: 22 U/L (ref 17–59)
BASOPHILS # BLD: 0 10*3/UL (ref 0.02–0.1)
BILIRUB SERPL-MCNC: 0.6 MG/DL (ref 0.2–1.3)
CALCIUM SERPL-MCNC: 9.8 MG/DL (ref 8.4–10.2)
CO2 SERPL-SCNC: 23 MMOL/L (ref 22–30)
EOSINOPHIL # BLD: 0 10*3/UL (ref 0.04–0.4)
EOSINOPHIL NFR BLD: 0.6 % (ref 0–4)
ERYTHROCYTE [DISTWIDTH] IN BLOOD BY AUTOMATED COUNT: 13.6 % (ref 11.5–14.5)
ERYTHROCYTE [SEDIMENTATION RATE] IN BLOOD: 16 MM/HR (ref 0–20)
GLUCOSE SERPL-MCNC: 254 MG/DL (ref 75–110)
HCT VFR BLD AUTO: 40.4 % (ref 42–52)
HGB BLD-MCNC: 13.1 G/DL (ref 13.5–18)
LYMPHOCYTES # BLD: 0.9 10*3/UL (ref 1.5–4)
MCH RBC QN AUTO: 32 PG (ref 27–31)
MCHC RBC AUTO-ENTMCNC: 32 G/DL (ref 33–37)
MCV RBC AUTO: 98 FL (ref 78–100)
MONOCYTES # BLD: 0.5 10*3/UL (ref 0.2–0.8)
NEUTROPHILS # BLD: 5.4 10*3/UL (ref 1.4–6.5)
PLATELET # BLD AUTO: 223 K/MM3 (ref 130–400)
PMV BLD AUTO: 9.1 FL (ref 7.4–10.4)
POTASSIUM SERPL-SCNC: 4.5 MMOL/L (ref 3.6–5)
PROT SERPL-MCNC: 7 G/DL (ref 6.3–8.2)
RBC # BLD AUTO: 4.11 M/MM3 (ref 4.2–5.6)
SODIUM SERPL-SCNC: 139 MMOL/L (ref 137–145)
WBC # BLD AUTO: 6.9 K/MM3 (ref 4.8–10.8)

## 2019-04-17 ENCOUNTER — HOSPITAL ENCOUNTER (OUTPATIENT)
Dept: HOSPITAL 19 - COL.RAD | Age: 84
Discharge: HOME | End: 2019-04-17
Payer: MEDICARE

## 2019-04-17 VITALS — BODY MASS INDEX: 29.89 KG/M2 | HEIGHT: 70 IN | WEIGHT: 208.78 LBS

## 2019-04-17 VITALS — HEART RATE: 78 BPM | DIASTOLIC BLOOD PRESSURE: 61 MMHG | SYSTOLIC BLOOD PRESSURE: 115 MMHG

## 2019-04-17 VITALS — SYSTOLIC BLOOD PRESSURE: 117 MMHG | HEART RATE: 84 BPM | DIASTOLIC BLOOD PRESSURE: 65 MMHG

## 2019-04-17 VITALS — HEART RATE: 98 BPM | DIASTOLIC BLOOD PRESSURE: 77 MMHG | SYSTOLIC BLOOD PRESSURE: 136 MMHG

## 2019-04-17 VITALS — DIASTOLIC BLOOD PRESSURE: 68 MMHG | HEART RATE: 82 BPM | SYSTOLIC BLOOD PRESSURE: 124 MMHG

## 2019-04-17 VITALS — DIASTOLIC BLOOD PRESSURE: 65 MMHG | SYSTOLIC BLOOD PRESSURE: 123 MMHG | HEART RATE: 80 BPM

## 2019-04-17 VITALS — DIASTOLIC BLOOD PRESSURE: 66 MMHG | SYSTOLIC BLOOD PRESSURE: 116 MMHG | HEART RATE: 83 BPM

## 2019-04-17 DIAGNOSIS — M43.17: Primary | ICD-10-CM

## 2019-04-17 DIAGNOSIS — M47.26: ICD-10-CM

## 2019-04-17 DIAGNOSIS — M48.07: ICD-10-CM

## 2019-04-17 DIAGNOSIS — Z98.1: ICD-10-CM

## 2019-04-17 NOTE — NUR
discharge instructions given to pt.Pt verbalizes understanding.Pt called ride
home,will monitor until arrival.

## 2019-04-23 ENCOUNTER — HOSPITAL ENCOUNTER (OUTPATIENT)
Dept: HOSPITAL 19 - MHCPAIN | Age: 84
End: 2019-04-23
Attending: ANESTHESIOLOGY
Payer: MEDICARE

## 2019-04-23 ENCOUNTER — HOSPITAL ENCOUNTER (OUTPATIENT)
Dept: HOSPITAL 19 - COL.RAD | Age: 84
End: 2019-04-23
Attending: ANESTHESIOLOGY
Payer: MEDICARE

## 2019-04-23 DIAGNOSIS — M96.1: ICD-10-CM

## 2019-04-23 DIAGNOSIS — M47.817: ICD-10-CM

## 2019-04-23 DIAGNOSIS — G89.29: Primary | ICD-10-CM

## 2019-04-23 DIAGNOSIS — M48.061: ICD-10-CM

## 2019-04-23 DIAGNOSIS — M54.16: ICD-10-CM

## 2019-04-23 DIAGNOSIS — M53.3: ICD-10-CM

## 2019-04-23 DIAGNOSIS — M43.5X7: ICD-10-CM

## 2019-04-23 DIAGNOSIS — M96.1: Primary | ICD-10-CM

## 2019-04-23 DIAGNOSIS — M46.96: ICD-10-CM

## 2019-04-23 DIAGNOSIS — Z98.1: ICD-10-CM

## 2019-04-23 PROCEDURE — G0463 HOSPITAL OUTPT CLINIC VISIT: HCPCS

## 2019-05-20 ENCOUNTER — HOSPITAL ENCOUNTER (OUTPATIENT)
Dept: HOSPITAL 19 - COL.RAD | Age: 84
End: 2019-05-20
Attending: ANESTHESIOLOGY
Payer: MEDICARE

## 2019-05-20 DIAGNOSIS — Z98.1: ICD-10-CM

## 2019-05-20 DIAGNOSIS — M48.07: ICD-10-CM

## 2019-05-20 DIAGNOSIS — M48.061: ICD-10-CM

## 2019-05-20 DIAGNOSIS — Z01.812: Primary | ICD-10-CM

## 2019-05-20 DIAGNOSIS — M43.16: ICD-10-CM

## 2019-05-20 DIAGNOSIS — M96.1: ICD-10-CM

## 2019-05-20 PROCEDURE — A9585 GADOBUTROL INJECTION: HCPCS

## 2019-08-30 ENCOUNTER — HOSPITAL ENCOUNTER (OUTPATIENT)
Dept: HOSPITAL 19 - WSC | Age: 84
LOS: 17 days | End: 2019-09-16
Payer: MEDICARE

## 2019-08-30 DIAGNOSIS — M43.8X9: Primary | ICD-10-CM

## 2019-08-30 DIAGNOSIS — M43.16: ICD-10-CM

## 2019-09-07 ENCOUNTER — HOSPITAL ENCOUNTER (EMERGENCY)
Dept: HOSPITAL 19 - COL.ER | Age: 84
Discharge: HOME | End: 2019-09-07
Payer: MEDICARE

## 2019-09-07 VITALS — HEART RATE: 89 BPM | DIASTOLIC BLOOD PRESSURE: 60 MMHG | SYSTOLIC BLOOD PRESSURE: 127 MMHG

## 2019-09-07 VITALS — HEIGHT: 70 IN | BODY MASS INDEX: 29.26 KG/M2 | WEIGHT: 204.37 LBS

## 2019-09-07 VITALS — TEMPERATURE: 97.7 F

## 2019-09-07 DIAGNOSIS — I25.10: ICD-10-CM

## 2019-09-07 DIAGNOSIS — Z95.5: ICD-10-CM

## 2019-09-07 DIAGNOSIS — Z79.82: ICD-10-CM

## 2019-09-07 DIAGNOSIS — E11.649: Primary | ICD-10-CM

## 2019-09-07 DIAGNOSIS — Z79.4: ICD-10-CM

## 2019-09-07 LAB
ALBUMIN SERPL-MCNC: 4 GM/DL (ref 3.5–5)
ALP SERPL-CCNC: 73 U/L (ref 50–136)
ALT SERPL-CCNC: 10 U/L (ref 21–72)
ANION GAP SERPL CALC-SCNC: 8 MMOL/L (ref 7–16)
AST SERPL-CCNC: 28 U/L (ref 15–37)
BASOPHILS # BLD: 0 10*3/UL (ref 0–0.2)
BASOPHILS NFR BLD AUTO: 0.4 % (ref 0–2)
BILIRUB SERPL-MCNC: 0.4 MG/DL (ref 0–1)
BUN SERPL-MCNC: 19 MG/DL (ref 9–20)
CALCIUM SERPL-MCNC: 9.5 MG/DL (ref 8.4–10.2)
CHLORIDE SERPL-SCNC: 107 MMOL/L (ref 98–107)
CO2 SERPL-SCNC: 25 MMOL/L (ref 22–30)
CREAT SERPL-SCNC: 0.79 UMOL/L (ref 0.66–1.25)
EOSINOPHIL # BLD: 0.1 10*3/UL (ref 0–0.7)
EOSINOPHIL NFR BLD: 0.9 % (ref 0–4)
ERYTHROCYTE [DISTWIDTH] IN BLOOD BY AUTOMATED COUNT: 13.8 % (ref 11.5–14.5)
GLUCOSE SERPL-MCNC: 52 MG/DL (ref 74–106)
GLUCOSE UR QL STRIP.AUTO: (no result)
GRANULOCYTES # BLD AUTO: 86 % (ref 42.2–75.2)
HCT VFR BLD AUTO: 38.5 % (ref 42–52)
HGB BLD-MCNC: 12.3 G/DL (ref 13.5–18)
HYALINE CASTS #/AREA URNS LPF: (no result) /LPF
LYMPHOCYTES # BLD: 0.5 10*3/UL (ref 1.2–3.4)
LYMPHOCYTES NFR BLD: 6.3 % (ref 20–51)
MAGNESIUM SERPL-MCNC: 1.5 MG/DL (ref 1.6–2.3)
MCH RBC QN AUTO: 32 PG (ref 27–31)
MCHC RBC AUTO-ENTMCNC: 32 G/DL (ref 33–37)
MCV RBC AUTO: 101 FL (ref 80–100)
MONOCYTES # BLD: 0.5 10*3/UL (ref 0.1–0.6)
MONOCYTES NFR BLD AUTO: 5.9 % (ref 1.7–9.3)
NEUTROPHILS # BLD: 7 10*3/UL (ref 1.4–6.5)
PH UR STRIP.AUTO: 5 [PH] (ref 5–8)
PHOSPHATE SERPL-MCNC: 3.5 MG/DL (ref 2.5–4.5)
PLATELET # BLD AUTO: 198 K/MM3 (ref 130–400)
PMV BLD AUTO: 8.9 FL (ref 7.4–10.4)
POTASSIUM SERPL-SCNC: 4 MMOL/L (ref 3.4–5)
PROT SERPL-MCNC: 7 GM/DL (ref 6.4–8.2)
RBC # BLD AUTO: 3.83 M/MM3 (ref 4.2–5.6)
RBC # UR STRIP.AUTO: (no result) /UL
SODIUM SERPL-SCNC: 141 MMOL/L (ref 137–145)
SP GR UR STRIP.AUTO: 1.02 (ref 1–1.03)
SQUAMOUS # URNS: (no result) /HPF
URN COLLECT METHOD CLASS: (no result)

## 2019-09-25 ENCOUNTER — HOSPITAL ENCOUNTER (OUTPATIENT)
Dept: HOSPITAL 19 - DIA.ED | Age: 84
End: 2019-09-25
Payer: MEDICARE

## 2019-09-25 DIAGNOSIS — I10: ICD-10-CM

## 2019-09-25 DIAGNOSIS — E11.40: Primary | ICD-10-CM

## 2019-09-25 DIAGNOSIS — Z79.4: ICD-10-CM

## 2019-09-25 PROCEDURE — G0108 DIAB MANAGE TRN  PER INDIV: HCPCS

## 2019-10-02 ENCOUNTER — HOSPITAL ENCOUNTER (INPATIENT)
Dept: HOSPITAL 19 - COL.ER | Age: 84
LOS: 2 days | Discharge: HOME HEALTH | DRG: 69 | End: 2019-10-04
Attending: STUDENT IN AN ORGANIZED HEALTH CARE EDUCATION/TRAINING PROGRAM | Admitting: STUDENT IN AN ORGANIZED HEALTH CARE EDUCATION/TRAINING PROGRAM
Payer: MEDICARE

## 2019-10-02 VITALS — OXYGEN SATURATION: 98 %

## 2019-10-02 VITALS — OXYGEN SATURATION: 95 %

## 2019-10-02 VITALS — OXYGEN SATURATION: 96 %

## 2019-10-02 VITALS — OXYGEN SATURATION: 99 %

## 2019-10-02 VITALS — OXYGEN SATURATION: 97 %

## 2019-10-02 VITALS — OXYGEN SATURATION: 93 %

## 2019-10-02 VITALS — OXYGEN SATURATION: 84 %

## 2019-10-02 VITALS — OXYGEN SATURATION: 88 %

## 2019-10-02 VITALS
HEART RATE: 91 BPM | OXYGEN SATURATION: 97 % | TEMPERATURE: 97.7 F | DIASTOLIC BLOOD PRESSURE: 73 MMHG | SYSTOLIC BLOOD PRESSURE: 133 MMHG

## 2019-10-02 VITALS — OXYGEN SATURATION: 94 %

## 2019-10-02 VITALS — OXYGEN SATURATION: 100 %

## 2019-10-02 VITALS — OXYGEN SATURATION: 92 %

## 2019-10-02 VITALS
OXYGEN SATURATION: 97 % | SYSTOLIC BLOOD PRESSURE: 110 MMHG | TEMPERATURE: 97.8 F | HEART RATE: 81 BPM | DIASTOLIC BLOOD PRESSURE: 47 MMHG

## 2019-10-02 VITALS — OXYGEN SATURATION: 77 %

## 2019-10-02 VITALS — OXYGEN SATURATION: 90 %

## 2019-10-02 VITALS — BODY MASS INDEX: 28.09 KG/M2 | WEIGHT: 196.21 LBS | HEIGHT: 70 IN

## 2019-10-02 VITALS
HEART RATE: 92 BPM | SYSTOLIC BLOOD PRESSURE: 134 MMHG | OXYGEN SATURATION: 96 % | TEMPERATURE: 97.8 F | DIASTOLIC BLOOD PRESSURE: 76 MMHG

## 2019-10-02 VITALS
TEMPERATURE: 97.7 F | DIASTOLIC BLOOD PRESSURE: 73 MMHG | HEART RATE: 95 BPM | OXYGEN SATURATION: 100 % | SYSTOLIC BLOOD PRESSURE: 133 MMHG

## 2019-10-02 VITALS — OXYGEN SATURATION: 91 %

## 2019-10-02 VITALS — OXYGEN SATURATION: 82 %

## 2019-10-02 VITALS — OXYGEN SATURATION: 83 %

## 2019-10-02 VITALS — OXYGEN SATURATION: 89 %

## 2019-10-02 DIAGNOSIS — I25.10: ICD-10-CM

## 2019-10-02 DIAGNOSIS — R33.9: ICD-10-CM

## 2019-10-02 DIAGNOSIS — G81.94: ICD-10-CM

## 2019-10-02 DIAGNOSIS — G51.0: ICD-10-CM

## 2019-10-02 DIAGNOSIS — Z79.84: ICD-10-CM

## 2019-10-02 DIAGNOSIS — Z88.6: ICD-10-CM

## 2019-10-02 DIAGNOSIS — G45.9: Primary | ICD-10-CM

## 2019-10-02 DIAGNOSIS — F03.90: ICD-10-CM

## 2019-10-02 DIAGNOSIS — E11.649: ICD-10-CM

## 2019-10-02 DIAGNOSIS — Z79.82: ICD-10-CM

## 2019-10-02 DIAGNOSIS — I10: ICD-10-CM

## 2019-10-02 DIAGNOSIS — G25.81: ICD-10-CM

## 2019-10-02 DIAGNOSIS — F32.9: ICD-10-CM

## 2019-10-02 DIAGNOSIS — E78.5: ICD-10-CM

## 2019-10-02 DIAGNOSIS — Z95.5: ICD-10-CM

## 2019-10-02 LAB
ALBUMIN SERPL-MCNC: 4.6 GM/DL (ref 3.5–5)
ALP SERPL-CCNC: 76 U/L (ref 50–136)
ALT SERPL-CCNC: 10 U/L (ref 21–72)
ANION GAP SERPL CALC-SCNC: 16 MMOL/L (ref 7–16)
AST SERPL-CCNC: 31 U/L (ref 15–37)
BASOPHILS # BLD: 0 10*3/UL (ref 0–0.2)
BASOPHILS NFR BLD AUTO: 0.4 % (ref 0–2)
BILIRUB SERPL-MCNC: 0.4 MG/DL (ref 0–1)
BUN SERPL-MCNC: 19 MG/DL (ref 9–20)
CALCIUM SERPL-MCNC: 10 MG/DL (ref 8.4–10.2)
CHLORIDE SERPL-SCNC: 105 MMOL/L (ref 98–107)
CO2 SERPL-SCNC: 21 MMOL/L (ref 22–30)
CREAT SERPL-SCNC: 0.97 UMOL/L (ref 0.66–1.25)
EOSINOPHIL # BLD: 0 10*3/UL (ref 0–0.7)
EOSINOPHIL NFR BLD: 0.1 % (ref 0–4)
ERYTHROCYTE [DISTWIDTH] IN BLOOD BY AUTOMATED COUNT: 13.9 % (ref 11.5–14.5)
GLUCOSE SERPL-MCNC: 72 MG/DL (ref 74–106)
GRANULOCYTES # BLD AUTO: 89 % (ref 42.2–75.2)
HCT VFR BLD AUTO: 41.7 % (ref 42–52)
HGB BLD-MCNC: 13.6 G/DL (ref 13.5–18)
INR BLD: 0.9 (ref 0.8–3)
LYMPHOCYTES # BLD: 0.4 10*3/UL (ref 1.2–3.4)
LYMPHOCYTES NFR BLD: 5.2 % (ref 20–51)
MCH RBC QN AUTO: 33 PG (ref 27–31)
MCHC RBC AUTO-ENTMCNC: 33 G/DL (ref 33–37)
MCV RBC AUTO: 100 FL (ref 80–100)
MONOCYTES # BLD: 0.4 10*3/UL (ref 0.1–0.6)
MONOCYTES NFR BLD AUTO: 4.9 % (ref 1.7–9.3)
NEUTROPHILS # BLD: 6.8 10*3/UL (ref 1.4–6.5)
PLATELET # BLD AUTO: 212 K/MM3 (ref 130–400)
PMV BLD AUTO: 8.9 FL (ref 7.4–10.4)
POTASSIUM SERPL-SCNC: 4.2 MMOL/L (ref 3.4–5)
PROT SERPL-MCNC: 7.8 GM/DL (ref 6.4–8.2)
PROTHROMBIN TIME: 10.8 SECONDS (ref 9.7–12.8)
RBC # BLD AUTO: 4.19 M/MM3 (ref 4.2–5.6)
SODIUM SERPL-SCNC: 142 MMOL/L (ref 137–145)
TROPONIN I SERPL-MCNC: 0.02 NG/ML (ref 0–0.04)

## 2019-10-02 PROCEDURE — A9585 GADOBUTROL INJECTION: HCPCS

## 2019-10-02 NOTE — NUR
PT BACK FROM MRI. PT ASSISTED BACK TO ICU BED AND PLACED ON BEDSIDE CONTINUOUS
MONITOR. PT TOLERATED WELL. PT STATES HE IS READY TO EAT NOW. MOOD PLEASANT.
CALL LIGHT WITHIN REACH. FAMILY AT BEDSIDE.

## 2019-10-02 NOTE — NUR
PT assessed, vitals stable, denies pain. Stating that he is hungry. Will
provide food and continue to monitor.

## 2019-10-02 NOTE — NUR
PT BACK FROM MRI RT POWER OUTAGE AND MACHINE HAVING TO BE REBOOTED. SON AT
BEDSIDE AND EDUCATED ON WILL TRY AND SCAN THE PT ONCE EQUIPMENT IS WORKING.
SPOKE TO MRI TECH, AWAITING NOTIFICATION OF WHEN THEY CAN RETRY THE SCAN.

## 2019-10-02 NOTE — NUR
PT TO BSC X1 ASSIST. SEE I&O FLOWSHEET. STEADY GAIT NOTED. PT ABLE TO ASSIST
WITH REPOSITIONING BACK IN BED. CALL LIGHT WITHIN REACH. VSS.

## 2019-10-02 NOTE — NUR
BEDSIDE SWALLOW PERFORMED PER DR ALBERTO. PHYSICIAN STATES IF PT PASSES THEN
HE CAN EAT. PT PASSES NURSING BEDSIDE SWALLOW WITHOUT ANY DIFFICULTIES.

## 2019-10-03 VITALS — OXYGEN SATURATION: 98 %

## 2019-10-03 VITALS — OXYGEN SATURATION: 95 %

## 2019-10-03 VITALS — OXYGEN SATURATION: 99 %

## 2019-10-03 VITALS — OXYGEN SATURATION: 96 %

## 2019-10-03 VITALS — OXYGEN SATURATION: 94 %

## 2019-10-03 VITALS — OXYGEN SATURATION: 93 %

## 2019-10-03 VITALS — OXYGEN SATURATION: 87 %

## 2019-10-03 VITALS — OXYGEN SATURATION: 100 %

## 2019-10-03 VITALS — OXYGEN SATURATION: 97 %

## 2019-10-03 VITALS — OXYGEN SATURATION: 89 %

## 2019-10-03 VITALS — OXYGEN SATURATION: 92 %

## 2019-10-03 VITALS — HEART RATE: 95 BPM | TEMPERATURE: 97.8 F | SYSTOLIC BLOOD PRESSURE: 124 MMHG | DIASTOLIC BLOOD PRESSURE: 50 MMHG

## 2019-10-03 VITALS — HEART RATE: 85 BPM | TEMPERATURE: 97.7 F | SYSTOLIC BLOOD PRESSURE: 122 MMHG | DIASTOLIC BLOOD PRESSURE: 68 MMHG

## 2019-10-03 VITALS — OXYGEN SATURATION: 88 %

## 2019-10-03 VITALS
HEART RATE: 74 BPM | TEMPERATURE: 97.8 F | DIASTOLIC BLOOD PRESSURE: 61 MMHG | OXYGEN SATURATION: 99 % | SYSTOLIC BLOOD PRESSURE: 113 MMHG

## 2019-10-03 VITALS — OXYGEN SATURATION: 84 %

## 2019-10-03 VITALS — OXYGEN SATURATION: 83 %

## 2019-10-03 VITALS — OXYGEN SATURATION: 86 %

## 2019-10-03 VITALS — OXYGEN SATURATION: 81 %

## 2019-10-03 VITALS — OXYGEN SATURATION: 78 %

## 2019-10-03 VITALS
TEMPERATURE: 97.5 F | DIASTOLIC BLOOD PRESSURE: 76 MMHG | SYSTOLIC BLOOD PRESSURE: 126 MMHG | HEART RATE: 77 BPM | OXYGEN SATURATION: 99 %

## 2019-10-03 VITALS — OXYGEN SATURATION: 77 %

## 2019-10-03 VITALS — OXYGEN SATURATION: 91 %

## 2019-10-03 VITALS — OXYGEN SATURATION: 90 %

## 2019-10-03 VITALS — DIASTOLIC BLOOD PRESSURE: 58 MMHG | HEART RATE: 87 BPM | SYSTOLIC BLOOD PRESSURE: 125 MMHG | TEMPERATURE: 97.9 F

## 2019-10-03 VITALS
TEMPERATURE: 98.4 F | SYSTOLIC BLOOD PRESSURE: 118 MMHG | OXYGEN SATURATION: 96 % | DIASTOLIC BLOOD PRESSURE: 57 MMHG | HEART RATE: 88 BPM

## 2019-10-03 VITALS — OXYGEN SATURATION: 70 %

## 2019-10-03 VITALS — OXYGEN SATURATION: 85 %

## 2019-10-03 VITALS — OXYGEN SATURATION: 80 %

## 2019-10-03 LAB
ALBUMIN SERPL-MCNC: 3.7 GM/DL (ref 3.5–5)
ALP SERPL-CCNC: 64 U/L (ref 50–136)
ALT SERPL-CCNC: 16 U/L (ref 21–72)
ANION GAP SERPL CALC-SCNC: 7 MMOL/L (ref 7–16)
AST SERPL-CCNC: 31 U/L (ref 15–37)
BASOPHILS # BLD: 0 10*3/UL (ref 0–0.2)
BASOPHILS NFR BLD AUTO: 0.5 % (ref 0–2)
BILIRUB SERPL-MCNC: 0.5 MG/DL (ref 0–1)
BUN SERPL-MCNC: 16 MG/DL (ref 9–20)
CALCIUM SERPL-MCNC: 9.5 MG/DL (ref 8.4–10.2)
CHLORIDE SERPL-SCNC: 108 MMOL/L (ref 98–107)
CHOLEST SPEC-SCNC: 129 MG/DL (ref 120–200)
CHOLEST/HDLC SERPL-SRTO: 2
CO2 SERPL-SCNC: 26 MMOL/L (ref 22–30)
CREAT SERPL-SCNC: 1.02 UMOL/L (ref 0.66–1.25)
EOSINOPHIL # BLD: 0.1 10*3/UL (ref 0–0.7)
EOSINOPHIL NFR BLD: 1.4 % (ref 0–4)
ERYTHROCYTE [DISTWIDTH] IN BLOOD BY AUTOMATED COUNT: 14.1 % (ref 11.5–14.5)
GLUCOSE SERPL-MCNC: 126 MG/DL (ref 74–106)
GRANULOCYTES # BLD AUTO: 69.4 % (ref 42.2–75.2)
HCT VFR BLD AUTO: 37.4 % (ref 42–52)
HDLC SERPL-MCNC: 64 MG/DL
HGB BLD-MCNC: 12.3 G/DL (ref 13.5–18)
LDLC SERPL-MCNC: 51 MG/DL
LYMPHOCYTES # BLD: 1.1 10*3/UL (ref 1.2–3.4)
LYMPHOCYTES NFR BLD: 17.5 % (ref 20–51)
MCH RBC QN AUTO: 33 PG (ref 27–31)
MCHC RBC AUTO-ENTMCNC: 33 G/DL (ref 33–37)
MCV RBC AUTO: 99 FL (ref 80–100)
MONOCYTES # BLD: 0.7 10*3/UL (ref 0.1–0.6)
MONOCYTES NFR BLD AUTO: 11 % (ref 1.7–9.3)
NEUTROPHILS # BLD: 4.4 10*3/UL (ref 1.4–6.5)
PLATELET # BLD AUTO: 188 K/MM3 (ref 130–400)
PMV BLD AUTO: 9.6 FL (ref 7.4–10.4)
POTASSIUM SERPL-SCNC: 4.2 MMOL/L (ref 3.4–5)
PROT SERPL-MCNC: 6.6 GM/DL (ref 6.4–8.2)
RBC # BLD AUTO: 3.79 M/MM3 (ref 4.2–5.6)
SODIUM SERPL-SCNC: 141 MMOL/L (ref 137–145)
TRIGL SERPL-MCNC: 71 MG/DL

## 2019-10-03 NOTE — NUR
Initial shift assessment done-  sitting up in chair, family visiting, pt
alert/oriented x3, states still has a headache/neck ache/will give tylenol as
ordered. Bed alarm on. Tele on

## 2019-10-03 NOTE — NUR
(10/2) MSW student responded to a  referral at the ED due to
patient lives alone and having a hard time adjusting to changes in life. MSW
student provided resources to [facility] in [place]. The
patient was admitted to the hospital for weakness. MSW student collaborated
the above information with the patient's nurse.

## 2019-10-03 NOTE — NUR
RECEIVED REPORT FROM RADHA IN ICU. PT ARRIVED TO MEDICAL FLOOR ROOM 356. HE
WAS BROUGHT BY WHEELCHAIR. AT THIS TIME HE WAS ASSESSED AND VITALS WERE WNL.
HE HAS A 20G INT THAT IS C/D/I. HE HAS AN ABRASION FROM HIS FALL ON HIS RIGHT
FOREARM AND L AKERS. HE IS BEING FOLLOWED BY  WHILE THE FAMILY
IS LOOKING FOR AN ASSISTED LIVING HOME. PT ORDERED FOOD, AND IS SITTING IN HIS
RECLINER. CALL LIGHT AND PHONE WITHIN REACH. NO OTHER CONCERNS AT THIS TIME.

## 2019-10-03 NOTE — NUR
The patient is to transfer to medical floor today, 10/3 to room 356. MSW
student contacted patient's son, Luigi [phone] and left a message.
 will continue to follow.

## 2019-10-03 NOTE — NUR
MSW yi met with the patient to discuss a discharge plan. The patient
lives alone in [place]. The patient has a walker and two canes. Patient
reports he uses a cane for balance when he is out in the community. The
patient reports independence with ADLs. The patient's PCP is Dr. Hewitt and
patient has medications delivered from [facility].  The patient does
not have advanced directives in the EMR. The patient's son, Luigi reports he
is the patient's guardian and DPOA-HC. The patient's son, Luigi to bring the
paperwork to place in patient's chart. The patient's son inquired about long-
term placement for the patient. MSW student provided list of nursing homes and
assisted living facilities. The discharge plan is to return home with Luigi
providing transportation.  will continue to follow to ensure
safe discharge.

## 2019-10-03 NOTE — NUR
PT'S SPO2 98 ON 2LNC.  AT THIS TIME PT WAS PLACED ON RA FOR TRIAL OFF OF O2.
PT IS BEING TRANSFERRED TO MEDICAL ROOM 356 AT THIS TIME.

## 2019-10-03 NOTE — NUR
PT HAD AN EPISODE OF INCONTINENCE WHILE ATTEMPTING TO AMBULATE WITH HIS WALKER
TO THE BATHROOM. CLEANED UP FLOOR AND PT. CHANGED GOWN AND SOCKS.
REITERATED TO THE PATIENT TO CALL FOR HELP FOR AMBULATION TO THE
BATHROOM. DURING CLEAN UP, PT PENIS IS BLEEDING FROM THE URETHRA.
CALLED RADHA FROM ICU WHO GAVE REPORT TO ASK ABOUT ANY TRAUMA THAT MAY HAVE
HAPPENED IN ICU, AND SHE STATED HE DID NOT HAVE ANY BLEEDING AFTER THE
STRAIGHT CATH FROM THE PREVIOUS NIGHT AND HAD VOIDED TWICE SINCE THE
CATHETERIZATION. PT STATES HE IS IN NO PAIN. WILL CONTINUE TO MONITOR.

## 2019-10-04 VITALS — HEART RATE: 83 BPM | TEMPERATURE: 98.1 F | SYSTOLIC BLOOD PRESSURE: 117 MMHG | DIASTOLIC BLOOD PRESSURE: 50 MMHG

## 2019-10-04 VITALS — DIASTOLIC BLOOD PRESSURE: 63 MMHG | SYSTOLIC BLOOD PRESSURE: 132 MMHG | HEART RATE: 85 BPM | TEMPERATURE: 97.6 F

## 2019-10-04 VITALS — DIASTOLIC BLOOD PRESSURE: 63 MMHG | SYSTOLIC BLOOD PRESSURE: 119 MMHG | HEART RATE: 88 BPM | TEMPERATURE: 98 F

## 2019-10-04 VITALS — TEMPERATURE: 98.8 F | SYSTOLIC BLOOD PRESSURE: 120 MMHG | HEART RATE: 70 BPM | DIASTOLIC BLOOD PRESSURE: 60 MMHG

## 2019-10-04 VITALS — DIASTOLIC BLOOD PRESSURE: 62 MMHG | SYSTOLIC BLOOD PRESSURE: 121 MMHG | TEMPERATURE: 97.3 F | HEART RATE: 78 BPM

## 2019-10-04 NOTE — NUR
The patient is ready to discharge today, 10/4. BENJI met with the patient to
review discharge plan and to discuss home health services. The patient reports
that he would be agreeable to home health, but requested that BENJI contact his
son to discuss the option. BENJI then contacted the patient's son, Luigi. Luigi
was in agreeance with the patient returning back home with home health. BENJI
reviewed Medicare.gov's list of home health agencies that serve [place]. The
patient's son chose [facility] and would be interested in speaking to Luis Daniel at
[facility]. BENJI contacted and faxed a referral to Luis Daniel at [facility]. Luis Daniel reports
that they can accept the patient for services and will contact the son.
 
The patient is to discharge back home today, 10/4, with home health services
for skilled nursing/PT/OT through [facility]. No additional needs at this time.

## 2019-10-04 NOTE — NUR
PT IS DISCHARGING TODAY. TALKED WITH SW ABOUT HOME HEALTH AND THAT THE PT WAS
AGREEABLE. PT WAITING ON SON WHO IS GUARDIAN AND DPOA TO LEAVE THE HOSPITAL,
HIS SON WILL ARRIVE BETWEEN [phone].

## 2019-10-04 NOTE — NUR
The patient's son provided SW with the patient's advanced directives and
Guardianship and Conservator forms. SW placed in the patient's chart.

## 2019-10-04 NOTE — NUR
PT ESCORTED OUT BY VIA [facility] STAFF, SON ACCOMPANIED THEM. PT D/C WITH ORDERS
FOR HOME HEALTH. NO OTHER CONCERNS REGARDING DISCHARGE.

## 2019-10-04 NOTE — NUR
INITIAL; PT IS SITTING IN RECLINER. READY TO ORDER BREAKFAST. NEW ORDER OF
70/30 INSULIN IN PLACE, WAITING FOR FOOD TO ARRIVE TO ADMINISTER. ASSESSMENT
COMPLETE, VS WNL. NO CONCERNS AT THIS TIME. CALL LIGHT AND PHONE WITHIN REACH.

## 2019-10-04 NOTE — NUR
Quiet night- blood sugar did drop to 88 from 352 in 3 hours with 8 units of
novolog-- pt did sleep fairly well- did void per urinal 2-3 times last night

## 2020-08-06 ENCOUNTER — HOSPITAL ENCOUNTER (OUTPATIENT)
Dept: HOSPITAL 19 - ZCOL.LAB | Age: 85
End: 2020-08-06
Payer: MEDICARE

## 2020-08-06 DIAGNOSIS — E78.5: ICD-10-CM

## 2020-08-06 DIAGNOSIS — E11.65: ICD-10-CM

## 2020-08-06 DIAGNOSIS — I25.10: Primary | ICD-10-CM

## 2020-08-06 LAB
ALBUMIN SERPL-MCNC: 3 GM/DL (ref 3.5–5)
ALP SERPL-CCNC: 97 U/L (ref 50–136)
ALT SERPL-CCNC: 17 U/L (ref 4–49)
ANION GAP SERPL CALC-SCNC: 5 MMOL/L (ref 7–16)
AST SERPL-CCNC: 21 U/L (ref 15–37)
BASOPHILS # BLD: 0.1 10*3/UL (ref 0–0.2)
BASOPHILS NFR BLD AUTO: 0.8 % (ref 0–2)
BILIRUB SERPL-MCNC: 0.4 MG/DL (ref 0–1)
BUN SERPL-MCNC: 15 MG/DL (ref 9–20)
CALCIUM SERPL-MCNC: 8.9 MG/DL (ref 8.4–10.2)
CHLORIDE SERPL-SCNC: 105 MMOL/L (ref 98–107)
CHOLEST SPEC-SCNC: 91 MG/DL (ref 120–200)
CHOLEST/HDLC SERPL-SRTO: 2.4
CO2 SERPL-SCNC: 26 MMOL/L (ref 22–30)
CREAT SERPL-SCNC: 0.94 UMOL/L (ref 0.66–1.25)
EOSINOPHIL # BLD: 0.2 10*3/UL (ref 0–0.7)
EOSINOPHIL NFR BLD: 2.7 % (ref 0–4)
ERYTHROCYTE [DISTWIDTH] IN BLOOD BY AUTOMATED COUNT: 12.6 % (ref 11.5–14.5)
ERYTHROCYTE [SEDIMENTATION RATE] IN BLOOD: 11 MM/HR (ref 0–30)
GLUCOSE SERPL-MCNC: 124 MG/DL (ref 74–106)
GRANULOCYTES # BLD AUTO: 66.4 % (ref 42.2–75.2)
HCT VFR BLD AUTO: 36.4 % (ref 42–52)
HDLC SERPL-MCNC: 37 MG/DL
HGB BLD-MCNC: 12 G/DL (ref 13.5–18)
LDLC SERPL-MCNC: 30 MG/DL
LYMPHOCYTES # BLD: 1.4 10*3/UL (ref 1.2–3.4)
LYMPHOCYTES NFR BLD: 20.9 % (ref 20–51)
MAGNESIUM SERPL-MCNC: 1.6 MG/DL (ref 1.6–2.3)
MCH RBC QN AUTO: 32 PG (ref 27–31)
MCHC RBC AUTO-ENTMCNC: 33 G/DL (ref 33–37)
MCV RBC AUTO: 97 FL (ref 80–100)
MONOCYTES # BLD: 0.6 10*3/UL (ref 0.1–0.6)
MONOCYTES NFR BLD AUTO: 8.9 % (ref 1.7–9.3)
NEUTROPHILS # BLD: 4.4 10*3/UL (ref 1.4–6.5)
PLATELET # BLD AUTO: 183 K/MM3 (ref 130–400)
PMV BLD AUTO: 9.2 FL (ref 7.4–10.4)
POTASSIUM SERPL-SCNC: 4.1 MMOL/L (ref 3.4–5)
PROT SERPL-MCNC: 5.9 GM/DL (ref 6.4–8.2)
RBC # BLD AUTO: 3.77 M/MM3 (ref 4.2–5.6)
SODIUM SERPL-SCNC: 137 MMOL/L (ref 137–145)
TRIGL SERPL-MCNC: 120 MG/DL

## 2020-12-28 ENCOUNTER — HOSPITAL ENCOUNTER (OUTPATIENT)
Dept: HOSPITAL 6 - LAB | Age: 85
End: 2020-12-28
Attending: INTERNAL MEDICINE
Payer: MEDICARE

## 2020-12-28 DIAGNOSIS — I10: ICD-10-CM

## 2020-12-28 DIAGNOSIS — C61: ICD-10-CM

## 2020-12-28 DIAGNOSIS — I25.10: ICD-10-CM

## 2020-12-28 DIAGNOSIS — E11.9: Primary | ICD-10-CM

## 2020-12-28 DIAGNOSIS — K90.9: ICD-10-CM

## 2020-12-28 LAB
ALBUMIN SERPL-MCNC: 3.9 G/DL (ref 3.4–4.8)
ALT SERPL-CCNC: 17 U/L (ref 0–55)
AST SERPL-CCNC: 14 U/L (ref 5–34)
BASOPHILS # BLD: 0 10*3/UL (ref 0.02–0.1)
BILIRUB SERPL-MCNC: 0.5 MG/DL (ref 0.2–1.2)
CALCIUM SERPL-MCNC: 9.3 MG/DL (ref 8.3–10.5)
CO2 SERPL-SCNC: 26 MMOL/L (ref 23–31)
EOSINOPHIL # BLD: 0.1 10*3/UL (ref 0.04–0.4)
EOSINOPHIL NFR BLD: 1.5 % (ref 0–4)
ERYTHROCYTE [DISTWIDTH] IN BLOOD BY AUTOMATED COUNT: 12.8 % (ref 11.5–14.5)
GLUCOSE SERPL-MCNC: 171 MG/DL (ref 75–110)
HCT VFR BLD AUTO: 40.2 % (ref 42–52)
HGB BLD-MCNC: 13.1 G/DL (ref 13.5–18)
LYMPHOCYTES # BLD: 1.3 10*3/UL (ref 1.5–4)
MCH RBC QN AUTO: 31 PG (ref 27–31)
MCHC RBC AUTO-ENTMCNC: 33 G/DL (ref 33–37)
MCV RBC AUTO: 96 FL (ref 78–100)
MONOCYTES # BLD: 0.7 10*3/UL (ref 0.2–0.8)
NEUTROPHILS # BLD: 6 10*3/UL (ref 1.4–6.5)
PLATELET # BLD AUTO: 236 K/MM3 (ref 130–400)
PMV BLD AUTO: 8.8 FL (ref 7.4–10.4)
POTASSIUM SERPL-SCNC: 4.8 MMOL/L (ref 3.5–5.1)
PROT SERPL-MCNC: 6.7 G/DL (ref 6.2–8.1)
RBC # BLD AUTO: 4.18 M/MM3 (ref 4.2–5.6)
SODIUM SERPL-SCNC: 141 MMOL/L (ref 136–145)
WBC # BLD AUTO: 8.1 K/MM3 (ref 4.8–10.8)

## 2021-05-21 ENCOUNTER — HOSPITAL ENCOUNTER (OUTPATIENT)
Dept: HOSPITAL 19 - ZCOL.LAB | Age: 86
End: 2021-05-21
Payer: MEDICARE

## 2021-05-21 DIAGNOSIS — N39.0: Primary | ICD-10-CM

## 2021-05-21 LAB
PH UR STRIP.AUTO: 7 [PH] (ref 5–8)
RBC # UR STRIP.AUTO: (no result) /UL
SP GR UR STRIP.AUTO: 1.02 (ref 1–1.03)
SQUAMOUS # URNS: (no result) /HPF
UA DIPSTICK PNL UR STRIP.AUTO: (no result)
URINE LEUKOCYTE ESTERASE: (no result)
URN COLLECT METHOD CLASS: (no result)
UROBILINOGEN UR STRIP.AUTO-MCNC: 2 MG/DL

## 2021-07-27 ENCOUNTER — HOSPITAL ENCOUNTER (OUTPATIENT)
Dept: HOSPITAL 6 - LAB | Age: 86
End: 2021-07-27
Attending: INTERNAL MEDICINE
Payer: MEDICARE

## 2021-07-27 DIAGNOSIS — E78.5: ICD-10-CM

## 2021-07-27 DIAGNOSIS — C61: Primary | ICD-10-CM

## 2021-07-27 DIAGNOSIS — E11.9: ICD-10-CM

## 2021-07-27 DIAGNOSIS — K90.9: ICD-10-CM

## 2021-07-27 LAB
ALBUMIN SERPL-MCNC: 4.2 G/DL (ref 3.4–4.8)
ALT SERPL-CCNC: 16 U/L (ref 0–55)
AST SERPL-CCNC: 18 U/L (ref 5–34)
BASOPHILS # BLD: 0.04 10*3/UL (ref 0.02–0.1)
BILIRUB SERPL-MCNC: 0.4 MG/DL (ref 0.2–1.2)
CALCIUM SERPL-MCNC: 10.1 MG/DL (ref 8.3–10.5)
CO2 SERPL-SCNC: 23 MMOL/L (ref 23–31)
EOSINOPHIL # BLD: 0.13 10*3/UL (ref 0.04–0.4)
EOSINOPHIL NFR BLD: 1.3 % (ref 0–4)
ERYTHROCYTE [DISTWIDTH] IN BLOOD BY AUTOMATED COUNT: 12.4 % (ref 11.5–14.5)
GLUCOSE SERPL-MCNC: 121 MG/DL (ref 75–110)
HCT VFR BLD AUTO: 42.7 % (ref 42–52)
HGB BLD-MCNC: 14 G/DL (ref 13.5–18)
LYMPHOCYTES # BLD: 1.58 10*3/UL (ref 1.5–4)
MCH RBC QN AUTO: 32 PG (ref 27–31)
MCHC RBC AUTO-ENTMCNC: 33 G/DL (ref 33–37)
MCV RBC AUTO: 96 FL (ref 78–100)
MONOCYTES # BLD: 0.76 10*3/UL (ref 0.2–0.8)
NEUTROPHILS # BLD: 7.18 10*3/UL (ref 1.4–6.5)
PLATELET # BLD AUTO: 222 K/MM3 (ref 130–400)
PMV BLD AUTO: 8.5 FL (ref 7.4–10.4)
POTASSIUM SERPL-SCNC: 4.8 MMOL/L (ref 3.5–5.1)
PROT SERPL-MCNC: 7.6 G/DL (ref 6.2–8.1)
RBC # BLD AUTO: 4.43 M/MM3 (ref 4.2–5.6)
SODIUM SERPL-SCNC: 139 MMOL/L (ref 136–145)
WBC # BLD AUTO: 9.7 K/MM3 (ref 4.8–10.8)

## 2021-10-12 ENCOUNTER — HOSPITAL ENCOUNTER (EMERGENCY)
Dept: HOSPITAL 19 - COL.ER | Age: 86
Discharge: HOME | End: 2021-10-12
Attending: EMERGENCY MEDICINE
Payer: MEDICARE

## 2021-10-12 VITALS — SYSTOLIC BLOOD PRESSURE: 139 MMHG | HEART RATE: 63 BPM | DIASTOLIC BLOOD PRESSURE: 91 MMHG

## 2021-10-12 VITALS — HEIGHT: 70 IN | BODY MASS INDEX: 26.1 KG/M2 | WEIGHT: 182.32 LBS

## 2021-10-12 VITALS — TEMPERATURE: 97.9 F

## 2021-10-12 DIAGNOSIS — S91.114A: Primary | ICD-10-CM

## 2021-10-12 DIAGNOSIS — S80.211A: ICD-10-CM

## 2021-10-12 DIAGNOSIS — Z79.4: ICD-10-CM
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
## 2021-10-12 DIAGNOSIS — I10: ICD-10-CM
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
## 2021-10-12 DIAGNOSIS — Z23: ICD-10-CM

## 2021-10-12 DIAGNOSIS — Z79.82: ICD-10-CM

## 2021-10-12 DIAGNOSIS — Z98.890: ICD-10-CM

## 2021-10-12 DIAGNOSIS — E11.9: ICD-10-CM

## 2021-10-12 DIAGNOSIS — I25.10: ICD-10-CM

## 2021-10-12 DIAGNOSIS — K21.9: ICD-10-CM

## 2021-12-17 ENCOUNTER — HOSPITAL ENCOUNTER (OUTPATIENT)
Dept: HOSPITAL 6 - RAD | Age: 86
End: 2021-12-17
Attending: INTERNAL MEDICINE
Payer: MEDICARE

## 2021-12-17 DIAGNOSIS — S92.424A: ICD-10-CM

## 2021-12-17 DIAGNOSIS — S92.534A: ICD-10-CM

## 2021-12-17 DIAGNOSIS — S92.514A: ICD-10-CM

## 2021-12-17 DIAGNOSIS — L03.031: Primary | ICD-10-CM

## 2022-05-02 ENCOUNTER — HOSPITAL ENCOUNTER (OUTPATIENT)
Dept: HOSPITAL 6 - LAB | Age: 87
End: 2022-05-02
Attending: INTERNAL MEDICINE
Payer: MEDICARE

## 2022-05-02 DIAGNOSIS — C61: ICD-10-CM

## 2022-05-02 DIAGNOSIS — E11.9: ICD-10-CM

## 2022-05-02 DIAGNOSIS — I10: ICD-10-CM

## 2022-05-02 DIAGNOSIS — E53.8: Primary | ICD-10-CM

## 2022-05-02 DIAGNOSIS — E78.5: ICD-10-CM

## 2022-05-02 LAB
ALBUMIN SERPL-MCNC: 3.8 G/DL (ref 3.4–4.8)
ALT SERPL-CCNC: 18 U/L (ref 0–55)
AST SERPL-CCNC: 19 U/L (ref 5–34)
BASOPHILS # BLD: 0.05 K/MM3 (ref 0.02–0.1)
BILIRUB SERPL-MCNC: 0.6 MG/DL (ref 0.2–1.2)
CALCIUM SERPL-MCNC: 9.4 MG/DL (ref 8.3–10.5)
CO2 SERPL-SCNC: 23 MMOL/L (ref 23–31)
EOSINOPHIL # BLD: 0.16 K/MM3 (ref 0.04–0.4)
EOSINOPHIL NFR BLD: 2 % (ref 0–4)
ERYTHROCYTE [DISTWIDTH] IN BLOOD BY AUTOMATED COUNT: 13 % (ref 11.5–14.5)
ERYTHROCYTE [SEDIMENTATION RATE] IN BLOOD: 12 MM/HR (ref 0–20)
GLUCOSE SERPL-MCNC: 98 MG/DL (ref 75–110)
HCT VFR BLD AUTO: 38.6 % (ref 42–52)
HGB BLD-MCNC: 13 G/DL (ref 13.5–18)
LYMPHOCYTES # BLD: 1.68 K/MM3 (ref 1.5–4)
MAGNESIUM SERPL-MCNC: 1.39 MG/DL (ref 1.6–2.6)
MCH RBC QN AUTO: 32 PG (ref 27–31)
MCHC RBC AUTO-ENTMCNC: 34 G/DL (ref 33–37)
MCV RBC AUTO: 96 FL (ref 78–100)
MONOCYTES # BLD: 0.57 K/MM3 (ref 0.2–0.8)
NEUTROPHILS # BLD: 5.44 K/MM3 (ref 1.4–6.5)
PLATELET # BLD AUTO: 235 K/MM3 (ref 130–400)
PMV BLD AUTO: 9 FL (ref 7.4–10.4)
POTASSIUM SERPL-SCNC: 4.3 MMOL/L (ref 3.5–5.1)
PROT SERPL-MCNC: 6.7 G/DL (ref 6.2–8.1)
RBC # BLD AUTO: 4.03 M/MM3 (ref 4.2–5.6)
SODIUM SERPL-SCNC: 144 MMOL/L (ref 136–145)
WBC # BLD AUTO: 7.9 K/MM3 (ref 4.8–10.8)

## 2022-05-23 ENCOUNTER — HOSPITAL ENCOUNTER (OUTPATIENT)
Dept: HOSPITAL 6 - LAB | Age: 87
End: 2022-05-23
Attending: INTERNAL MEDICINE
Payer: MEDICARE

## 2022-05-23 DIAGNOSIS — N39.0: Primary | ICD-10-CM

## 2022-05-23 LAB
APPEARANCE UR: (no result)
BILIRUB UR QL STRIP.AUTO: NEGATIVE
COLOR UR AUTO: YELLOW
GLUCOSE UR QL STRIP.AUTO: NEGATIVE
KETONES UR QL STRIP.AUTO: NEGATIVE
LEUKOCYTE ESTERASE UR QL STRIP: (no result)
NITRITE UR QL STRIP: POSITIVE
PH UR STRIP.AUTO: 5 [PH] (ref 5–8)
PROT ?TM UR-MCNC: (no result) MG/DL
RBC UR QL AUTO: (no result)
SP GR UR STRIP.AUTO: 1.02 (ref 1–1.03)
UROBILINOGEN UR-MCNC: NORMAL MG/DL

## 2022-08-13 ENCOUNTER — HOSPITAL ENCOUNTER (EMERGENCY)
Dept: HOSPITAL 19 - COL.ER | Age: 87
Discharge: HOME | End: 2022-08-13
Attending: STUDENT IN AN ORGANIZED HEALTH CARE EDUCATION/TRAINING PROGRAM
Payer: MEDICARE

## 2022-08-13 VITALS — DIASTOLIC BLOOD PRESSURE: 80 MMHG | SYSTOLIC BLOOD PRESSURE: 129 MMHG | HEART RATE: 82 BPM

## 2022-08-13 VITALS — WEIGHT: 190.48 LBS | BODY MASS INDEX: 27.27 KG/M2 | HEIGHT: 70 IN

## 2022-08-13 VITALS — TEMPERATURE: 97.9 F

## 2022-08-13 DIAGNOSIS — S00.83XA: ICD-10-CM

## 2022-08-13 DIAGNOSIS — S09.90XA: Primary | ICD-10-CM
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
## 2022-08-29 ENCOUNTER — HOSPITAL ENCOUNTER (OUTPATIENT)
Dept: HOSPITAL 6 - LAB | Age: 87
End: 2022-08-29
Attending: INTERNAL MEDICINE
Payer: MEDICARE

## 2022-08-29 DIAGNOSIS — E78.5: ICD-10-CM

## 2022-08-29 DIAGNOSIS — C61: Primary | ICD-10-CM

## 2022-08-29 DIAGNOSIS — K90.9: ICD-10-CM

## 2022-08-29 DIAGNOSIS — I10: ICD-10-CM

## 2022-08-29 DIAGNOSIS — F32.9: ICD-10-CM

## 2022-08-29 DIAGNOSIS — E11.9: ICD-10-CM

## 2022-08-29 DIAGNOSIS — E53.8: ICD-10-CM

## 2022-08-29 DIAGNOSIS — M54.50: ICD-10-CM

## 2022-08-29 DIAGNOSIS — M96.1: ICD-10-CM

## 2022-08-29 LAB
ALBUMIN SERPL-MCNC: 3.9 G/DL (ref 3.4–4.8)
ALT SERPL-CCNC: 20 U/L (ref 0–55)
AST SERPL-CCNC: 21 U/L (ref 5–34)
BASOPHILS # BLD: 0.06 K/MM3 (ref 0.02–0.1)
BILIRUB SERPL-MCNC: 0.5 MG/DL (ref 0.2–1.2)
CALCIUM SERPL-MCNC: 9.4 MG/DL (ref 8.3–10.5)
CO2 SERPL-SCNC: 22 MMOL/L (ref 23–31)
EOSINOPHIL # BLD: 0.15 K/MM3 (ref 0.04–0.4)
EOSINOPHIL NFR BLD: 1.8 % (ref 0–4)
ERYTHROCYTE [DISTWIDTH] IN BLOOD BY AUTOMATED COUNT: 12.3 % (ref 11.5–14.5)
GLUCOSE SERPL-MCNC: 207 MG/DL (ref 75–110)
HCT VFR BLD AUTO: 39.7 % (ref 42–52)
HGB BLD-MCNC: 12.9 G/DL (ref 13.5–18)
LYMPHOCYTES # BLD: 1.54 K/MM3 (ref 1.5–4)
MAGNESIUM SERPL-MCNC: 1.66 MG/DL (ref 1.6–2.6)
MCH RBC QN AUTO: 32 PG (ref 27–31)
MCHC RBC AUTO-ENTMCNC: 33 G/DL (ref 33–37)
MCV RBC AUTO: 98 FL (ref 78–100)
MONOCYTES # BLD: 0.59 K/MM3 (ref 0.2–0.8)
NEUTROPHILS # BLD: 6.17 K/MM3 (ref 1.4–6.5)
PLATELET # BLD AUTO: 226 K/MM3 (ref 130–400)
PMV BLD AUTO: 8.7 FL (ref 7.4–10.4)
POTASSIUM SERPL-SCNC: 4.5 MMOL/L (ref 3.5–5.1)
PROT SERPL-MCNC: 7 G/DL (ref 6.2–8.1)
RBC # BLD AUTO: 4.07 M/MM3 (ref 4.2–5.6)
SODIUM SERPL-SCNC: 139 MMOL/L (ref 136–145)
WBC # BLD AUTO: 8.5 K/MM3 (ref 4.8–10.8)

## 2023-01-19 ENCOUNTER — HOSPITAL ENCOUNTER (OUTPATIENT)
Dept: HOSPITAL 6 - LAB | Age: 88
End: 2023-01-19
Attending: INTERNAL MEDICINE
Payer: MEDICARE

## 2023-01-19 DIAGNOSIS — I25.10: ICD-10-CM

## 2023-01-19 DIAGNOSIS — K90.9: ICD-10-CM

## 2023-01-19 DIAGNOSIS — M81.0: Primary | ICD-10-CM

## 2023-01-19 DIAGNOSIS — E11.9: ICD-10-CM

## 2023-01-19 DIAGNOSIS — E53.8: ICD-10-CM

## 2023-01-19 DIAGNOSIS — I10: ICD-10-CM

## 2023-01-19 DIAGNOSIS — E85.9: ICD-10-CM

## 2023-01-19 LAB
ALBUMIN SERPL-MCNC: 3.8 G/DL (ref 3.4–4.8)
ALT SERPL-CCNC: 15 U/L (ref 0–55)
APPEARANCE UR: (no result)
AST SERPL-CCNC: 16 U/L (ref 5–34)
BASOPHILS # BLD: 0.03 K/MM3 (ref 0.02–0.1)
BILIRUB SERPL-MCNC: 0.6 MG/DL (ref 0.2–1.2)
BILIRUB UR QL STRIP.AUTO: NEGATIVE
CALCIUM SERPL-MCNC: 9.6 MG/DL (ref 8.3–10.5)
CO2 SERPL-SCNC: 24 MMOL/L (ref 23–31)
COLOR UR AUTO: YELLOW
EOSINOPHIL # BLD: 0.11 K/MM3 (ref 0.04–0.4)
EOSINOPHIL NFR BLD: 1.4 % (ref 0–4)
ERYTHROCYTE [DISTWIDTH] IN BLOOD BY AUTOMATED COUNT: 12 % (ref 11.5–14.5)
ERYTHROCYTE [SEDIMENTATION RATE] IN BLOOD: 15 MM/HR (ref 0–20)
GLUCOSE SERPL-MCNC: 161 MG/DL (ref 75–110)
GLUCOSE UR QL STRIP.AUTO: NEGATIVE
HCT VFR BLD AUTO: 39.6 % (ref 42–52)
HGB BLD-MCNC: 12.9 G/DL (ref 13.5–18)
KETONES UR QL STRIP.AUTO: NEGATIVE
LEUKOCYTE ESTERASE UR QL STRIP: (no result)
LYMPHOCYTES # BLD: 1.76 K/MM3 (ref 1.5–4)
MAGNESIUM SERPL-MCNC: 1.73 MG/DL (ref 1.6–2.6)
MCH RBC QN AUTO: 32 PG (ref 27–31)
MCHC RBC AUTO-ENTMCNC: 33 G/DL (ref 33–37)
MCV RBC AUTO: 100 FL (ref 78–100)
MONOCYTES # BLD: 0.55 K/MM3 (ref 0.2–0.8)
NEUTROPHILS # BLD: 5.13 K/MM3 (ref 1.4–6.5)
NITRITE UR QL STRIP: POSITIVE
PH UR STRIP.AUTO: 5 [PH] (ref 5–8)
PLATELET # BLD AUTO: 189 K/MM3 (ref 130–400)
PMV BLD AUTO: 8.8 FL (ref 7.4–10.4)
POTASSIUM SERPL-SCNC: 4.5 MMOL/L (ref 3.5–5.1)
PROT ?TM UR-MCNC: (no result) MG/DL
PROT SERPL-MCNC: 6.7 G/DL (ref 6.2–8.1)
RBC # BLD AUTO: 3.98 M/MM3 (ref 4.2–5.6)
RBC UR QL AUTO: (no result)
SODIUM SERPL-SCNC: 139 MMOL/L (ref 136–145)
SP GR UR STRIP.AUTO: 1.03 (ref 1–1.03)
UROBILINOGEN UR-MCNC: NORMAL MG/DL
WBC # BLD AUTO: 7.6 K/MM3 (ref 4.8–10.8)
WBC #/AREA URNS HPF: (no result) /HPF (ref 0–3)

## 2023-02-15 ENCOUNTER — HOSPITAL ENCOUNTER (OUTPATIENT)
Dept: HOSPITAL 6 - LAB | Age: 88
End: 2023-02-15
Attending: INTERNAL MEDICINE
Payer: MEDICARE

## 2023-02-15 DIAGNOSIS — N39.0: Primary | ICD-10-CM

## 2023-02-15 LAB
APPEARANCE UR: (no result)
BILIRUB UR QL STRIP.AUTO: NEGATIVE
COLOR UR AUTO: (no result)
GLUCOSE UR QL STRIP.AUTO: NEGATIVE
KETONES UR QL STRIP.AUTO: NEGATIVE
LEUKOCYTE ESTERASE UR QL STRIP: (no result)
NITRITE UR QL STRIP: NEGATIVE
PH UR STRIP.AUTO: 5 [PH] (ref 5–8)
PROT ?TM UR-MCNC: NEGATIVE MG/DL
RBC UR QL AUTO: (no result)
SP GR UR STRIP.AUTO: 1.01 (ref 1–1.03)
UROBILINOGEN UR-MCNC: NORMAL MG/DL
WBC #/AREA URNS HPF: (no result) /HPF (ref 0–3)

## 2023-10-25 ENCOUNTER — HOSPITAL ENCOUNTER (EMERGENCY)
Dept: HOSPITAL 19 - COL.ER | Age: 88
Discharge: HOME | End: 2023-10-25
Attending: PHYSICIAN ASSISTANT
Payer: MEDICARE

## 2023-10-25 VITALS — WEIGHT: 178.35 LBS | BODY MASS INDEX: 25.53 KG/M2 | HEIGHT: 70 IN

## 2023-10-25 VITALS — TEMPERATURE: 97.9 F

## 2023-10-25 VITALS — HEART RATE: 77 BPM | SYSTOLIC BLOOD PRESSURE: 142 MMHG | DIASTOLIC BLOOD PRESSURE: 77 MMHG

## 2023-10-25 DIAGNOSIS — N39.0: Primary | ICD-10-CM

## 2023-10-25 LAB
ALBUMIN SERPL-MCNC: 3.7 GM/DL (ref 3.4–4.8)
ALP SERPL-CCNC: 84 U/L (ref 40–150)
ALT SERPL-CCNC: 13 U/L (ref 0–55)
ANION GAP SERPL CALC-SCNC: 10 MMOL/L (ref 7–16)
AST SERPL-CCNC: 15 U/L (ref 5–34)
BASOPHILS # BLD: 0.1 K/MM3 (ref 0–0.2)
BASOPHILS NFR BLD AUTO: 0.9 % (ref 0–2)
BILIRUB SERPL-MCNC: 0.7 MG/DL (ref 0.2–1.2)
BUN SERPL-MCNC: 14 MG/DL (ref 8–26)
CALCIUM SERPL-MCNC: 9.6 MG/DL (ref 8.4–10.2)
CHLORIDE SERPL-SCNC: 107 MMOL/L (ref 98–107)
CO2 SERPL-SCNC: 24 MMOL/L (ref 23–31)
CREAT SERPL-SCNC: 0.95 MG/DL (ref 0.72–1.25)
EOSINOPHIL # BLD: 0.2 K/MM3 (ref 0–0.7)
EOSINOPHIL NFR BLD: 1.9 % (ref 0–4)
ERYTHROCYTE [DISTWIDTH] IN BLOOD BY AUTOMATED COUNT: 12.3 % (ref 11.5–14.5)
GLUCOSE SERPL-MCNC: 143 MG/DL (ref 70–99)
GRANULOCYTES # BLD AUTO: 77.4 % (ref 42.2–75.2)
HCT VFR BLD AUTO: 35.7 % (ref 42–52)
HGB BLD-MCNC: 11.6 G/DL (ref 13.5–18)
LIPASE SERPL-CCNC: < 4 U/L (ref 8–78)
LYMPHOCYTES # BLD: 1.1 K/MM3 (ref 1.2–3.4)
LYMPHOCYTES NFR BLD: 12.7 % (ref 20–51)
MCH RBC QN AUTO: 31 PG (ref 27–31)
MCHC RBC AUTO-ENTMCNC: 33 G/DL (ref 33–37)
MCV RBC AUTO: 96 FL (ref 80–100)
MONOCYTES # BLD: 0.6 K/MM3 (ref 0.1–0.6)
MONOCYTES NFR BLD AUTO: 6.9 % (ref 1.7–9.3)
NEUTROPHILS # BLD: 6.7 K/MM3 (ref 1.4–6.5)
PH UR STRIP.AUTO: 7 [PH] (ref 5–8.5)
PLATELET # BLD AUTO: 61 K/MM3 (ref 130–400)
PMV BLD AUTO: 10.6 FL (ref 7.4–10.4)
POTASSIUM SERPL-SCNC: 4.6 MMOL/L (ref 3.5–4.5)
PROT SERPL-MCNC: 6.7 GM/DL (ref 6.2–8.1)
RBC # BLD AUTO: 3.73 M/MM3 (ref 4.2–5.6)
RBC # UR STRIP.AUTO: (no result) /UL
RBC # UR: (no result) /HPF (ref 0–2)
SODIUM SERPL-SCNC: 141 MMOL/L (ref 136–145)
SP GR UR STRIP.AUTO: 1.01 (ref 1–1.03)
SQUAMOUS # URNS: (no result) /HPF (ref 0–10)
URN COLLECT METHOD CLASS: (no result)
UROBILINOGEN UR STRIP.AUTO-MCNC: 0.2 E.U/DL (ref 0.2–1)